# Patient Record
Sex: FEMALE | Race: BLACK OR AFRICAN AMERICAN | Employment: FULL TIME | URBAN - METROPOLITAN AREA
[De-identification: names, ages, dates, MRNs, and addresses within clinical notes are randomized per-mention and may not be internally consistent; named-entity substitution may affect disease eponyms.]

---

## 2017-10-20 ENCOUNTER — OFFICE VISIT (OUTPATIENT)
Dept: CARDIOLOGY CLINIC | Age: 62
End: 2017-10-20

## 2017-10-20 VITALS
DIASTOLIC BLOOD PRESSURE: 80 MMHG | OXYGEN SATURATION: 98 % | HEIGHT: 65 IN | BODY MASS INDEX: 26.33 KG/M2 | WEIGHT: 158 LBS | HEART RATE: 71 BPM | SYSTOLIC BLOOD PRESSURE: 150 MMHG

## 2017-10-20 DIAGNOSIS — Z87.19 HISTORY OF GASTROESOPHAGEAL REFLUX (GERD): ICD-10-CM

## 2017-10-20 DIAGNOSIS — R00.2 PALPITATIONS: Primary | ICD-10-CM

## 2017-10-20 NOTE — PROGRESS NOTES
1. Have you been to the ER, urgent care clinic since your last visit? Hospitalized since your last visit? No     2. Have you seen or consulted any other health care providers outside of the [address] [place] [address] since your last visit? Include any pap smears or colon screening.  No

## 2017-10-20 NOTE — PROGRESS NOTES
Valeri Woods presents today for evaluation of complaints of \"irregular heart beats. \"  She states that she has experienced palpitations before and this is similar but much milder than previous. She is under a lot of stress at work. The palpitations do not occur daily and she does notice a skipped beat or faster heart rate at times. She only drinks beverages with caffeine rarely and she has a small amount of chocolate almost daily. She is a 63year old female with history of palpitations, hyperthyroidism with thyroiditis, GERD, and atypical chest pain. Admits to having vertigo in the past.  She was last seen by Dr. Luis Manuel Rizzo in October 2016. Her last echo was done in 2007 and it showed an EFof 65% and no significant valvular pathology. She wore an event monitor in Feb. 2007, which showed sinus tachycardia. Denies chest pain, tightness, heaviness, and admits to palpitations. Denies shortness of breath at rest, dyspnea on exertion, orthopnea and PND. Denies abdominal bloating. Admits to occasional lightheadedness with sudden position changes, dizziness, and syncope. Denies lower extremity edema and claudication. Denies nausea, vomiting, diarrhea, melena, hematochezia. Denies hematuria, urgency, frequency. Denies fever, chills. She complains of some mild fatigue at times. PMH:  Past Medical History:   Diagnosis Date    Cardiac echocardiogram 01/30/2007    EF >65%. No significant valvular pathology.  History of gastroesophageal reflux (GERD)     Hyperthyroidism     s/thyroiditis.  Migraine equivalent     Palpitations     w/possible dual pathway on event monitor. PSH:  History reviewed. No pertinent surgical history. MEDS:  Current Outpatient Prescriptions   Medication Sig    metoprolol succinate (TOPROL-XL) 25 mg XL tablet Take 1 Tab by mouth daily.  fluticasone (FLONASE) 50 mcg/actuation nasal spray 2 Sprays by Right Nostril route daily.     cholecalciferol, vitamin D3, (VITAMIN D3) 2,000 unit tab Take  by mouth.  famotidine (PEPCID) 40 mg tablet Take 40 mg by mouth daily.  meclizine (ANTIVERT) 25 mg tablet Take 25 mg by mouth as needed. No current facility-administered medications for this visit. Allergies and Sensitivities:  Allergies   Allergen Reactions    Pseudoephedrine Palpitations    Sulfur Palpitations       Family History:  Family History   Problem Relation Age of Onset    Hypertension Mother     High Cholesterol Mother     Heart Surgery Father        Social History:  She  reports that she has never smoked. She has never used smokeless tobacco.  She  reports that she does not drink alcohol. Physical:  Visit Vitals    /80    Pulse 71    Ht 5' 5\" (1.651 m)    Wt 71.7 kg (158 lb)    SpO2 98%    BMI 26.29 kg/m2         Exam:  Neck:  Supple, no JVD, no carotid bruits  CV:  Normal S1 and  S2, no murmurs, rubs, or gallops noted  Lungs:  Clear to ausculation throughout, no wheezes or rales  Abd:  Soft, non-tender, non-distended with good bowel sounds. No hepatosplenomegaly  Extremities:  No edema      Data:  EKG:  Normal sinus rhythm, rate 71.  Long 1st degree AVB.  No acute change. LABS:  Lab Results   Component Value Date/Time    Sodium 140 12/09/2012 10:53 AM    Potassium 3.9 12/09/2012 10:53 AM    Chloride 104 12/09/2012 10:53 AM    CO2 29 12/09/2012 10:53 AM    Glucose 82 12/09/2012 10:53 AM    BUN 12 12/09/2012 10:53 AM    Creatinine 0.80 12/09/2012 10:53 AM     No results found for: CHOL, CHOLX, CHLST, CHOLV, HDL, LDL, LDLC, DLDLP, TGLX, TRIGL, TRIGP, CHHD, CHHDX  No results found for: GPT, ALT      Impression/Plan:  1. Palpitations, occurring occasionally  2. GERD, relieved by Pepcid  3. Hyperthyroidism  4. Elevated blood pressure    Mrs. Darline Homans was seen today for complaints of palpitations. They are similar to the ones she has had in the past, although they are not occurring as frequently.   She has been more aware of them lately as she has been under a lot of stress at work. She is trying to get back into an exercise routine as she states that this helped with stress relief in the past.     She denies chest pain and shortness of breath. Admits to some occasional fatigue. Her blood pressure was elevated at 164/94 upon arrival and when I rechecked it, it was down to 150/80. She states that she did not take her Toprol yet this morning as she did not eat breakfast yet. She states that she monitors her blood pressures at home and they typically range from the 408'A to 536'X systolic and 61'U to 53'O diastolic. I asked that she keep a blood pressure diary and to call our office if her blood pressures are consistently above 140/80. Will request that she wear a 48 hour Holter monitor (dx: palpitations) to evaluate frequency of ectopics (if present) as well as her overall heart rated. Follow-up to be scheduled to see Dr. Braulio Cueva (reminder in computer). Vivek Pineda MSN, FNP-BC    Please note:  Portions of this chart were created with Dragon medical speech to text program.  Unrecognized errors may be present.

## 2017-10-20 NOTE — MR AVS SNAPSHOT
Visit Information Date & Time Provider Department Dept. Phone Encounter #  
 10/20/2017  9:00 AM Christine Arias NP [facility] [address] [id number] Upcoming Health Maintenance Date Due Hepatitis C Screening 1955 DTaP/Tdap/Td series (1 - Tdap) 3/18/1978 PAP AKA CERVICAL CYTOLOGY 3/18/1978 BREAST CANCER SCRN MAMMOGRAM 3/18/2007 FOBT Q 1 YEAR AGE 50-75 3/18/2007 ZOSTER VACCINE AGE 60> 1/18/2017 INFLUENZA AGE 9 TO ADULT 8/1/2017 Allergies as of 10/20/2017  Review Complete On: 10/20/2017 By: Christine Arias NP Severity Noted Reaction Type Reactions Pseudoephedrine  12/09/2012    Palpitations Sulfur  12/09/2012    Palpitations Current Immunizations  Never Reviewed No immunizations on file. Not reviewed this visit You Were Diagnosed With   
  
 Codes Comments Palpitations    -  Primary ICD-10-CM: R00.2 ICD-9-CM: 785.1 History of gastroesophageal reflux (GERD)     ICD-10-CM: E22.88 ICD-9-CM: V12.79 Vitals BP Pulse Height(growth percentile) Weight(growth percentile) SpO2 BMI  
 150/80 71 5' 5\" (1.651 m) 158 lb (71.7 kg) 98% 26.29 kg/m2 OB Status Smoking Status Postmenopausal Never Smoker Vitals History BMI and BSA Data Body Mass Index Body Surface Area  
 26.29 kg/m 2 1.81 m 2 Preferred Pharmacy Pharmacy Name Phone [facility] [place], [address] [phone] Your Updated Medication List  
  
   
This list is accurate as of: 10/20/17  9:44 AM.  Always use your most recent med list.  
  
  
  
  
 fluticasone 50 mcg/actuation nasal spray Commonly known as:  Justino Metro 2 Sprays by Right Nostril route daily. meclizine 25 mg tablet Commonly known as:  ANTIVERT Take 25 mg by mouth as needed. metoprolol succinate 25 mg XL tablet Commonly known as:  TOPROL-XL  
 Take 1 Tab by mouth daily. PEPCID 40 mg tablet Generic drug:  famotidine Take 40 mg by mouth daily. VITAMIN D3 2,000 unit Tab Generic drug:  cholecalciferol (vitamin D3) Take  by mouth. We Performed the Following AMB POC EKG ROUTINE W/ 12 LEADS, INTER & REP [69950 CPT(R)] To-Do List   
 Around 10/27/2017 ECG:  ECG HOLTER MONITOR, UP TO 48 HRS Patient Instructions 48 hour Holter; dx: palpitations Follow-up with Dr. Dennis Garcia to be scheduled Introducing [facility]! Dear Laura Bryan: Thank you for requesting a ClearMyMail account. Our records indicate that you already have an active ClearMyMail account. You can access your account anytime at [email or web address] Did you know that you can access your hospital and ER discharge instructions at any time in ClearMyMail? You can also review all of your test results from your hospital stay or ER visit. Additional Information If you have questions, please visit the Frequently Asked Questions section of the ClearMyMail website at [email or web address] Remember, ClearMyMail is NOT to be used for urgent needs. For medical emergencies, dial 911. Now available from your iPhone and Android! Please provide this summary of care documentation to your next provider. Your primary care clinician is listed as Josue Scott. If you have any questions after today's visit, please call [phone].

## 2017-10-25 ENCOUNTER — HOSPITAL ENCOUNTER (OUTPATIENT)
Dept: NON INVASIVE DIAGNOSTICS | Age: 62
Discharge: HOME | End: 2017-10-25
Attending: NURSE PRACTITIONER
Payer: COMMERCIAL

## 2017-10-25 DIAGNOSIS — R00.2 PALPITATIONS: ICD-10-CM

## 2017-10-25 PROCEDURE — 93225 XTRNL ECG REC<48 HRS REC: CPT

## 2017-11-03 ENCOUNTER — TELEPHONE (OUTPATIENT)
Dept: CARDIOLOGY CLINIC | Age: 62
End: 2017-11-03

## 2017-11-03 NOTE — TELEPHONE ENCOUNTER
----- Message from Gladis Maxwell NP sent at 11/3/2017  9:06 AM EDT -----  Please call and let her know Holter was normal.  Sinus rhythm/sinus arrhythmia with 1 ectopic beat.   Max heart rate was 113 bpm.    Thanks,  Madina Menjivar  ----- Message -----     From: Dain, Card Result In     Sent: 11/2/2017   8:56 AM       To: Gladis Maxwell NP

## 2017-11-21 RX ORDER — METOPROLOL SUCCINATE 25 MG/1
TABLET, EXTENDED RELEASE ORAL
Qty: 30 TAB | Refills: 11 | Status: SHIPPED | OUTPATIENT
Start: 2017-11-21 | End: 2019-04-22 | Stop reason: ALTCHOICE

## 2018-03-20 ENCOUNTER — OFFICE VISIT (OUTPATIENT)
Dept: CARDIOLOGY CLINIC | Age: 63
End: 2018-03-20

## 2018-03-20 VITALS
DIASTOLIC BLOOD PRESSURE: 82 MMHG | WEIGHT: 161 LBS | OXYGEN SATURATION: 98 % | HEIGHT: 65 IN | SYSTOLIC BLOOD PRESSURE: 142 MMHG | BODY MASS INDEX: 26.82 KG/M2 | HEART RATE: 64 BPM

## 2018-03-20 DIAGNOSIS — R53.83 FATIGUE, UNSPECIFIED TYPE: ICD-10-CM

## 2018-03-20 DIAGNOSIS — E05.90 HYPERTHYROIDISM: ICD-10-CM

## 2018-03-20 DIAGNOSIS — R00.2 PALPITATIONS: Primary | ICD-10-CM

## 2018-03-20 NOTE — PROGRESS NOTES
Review of Systems   Constitutional: Negative for chills, fever, malaise/fatigue and weight loss. Respiratory: Negative for cough, hemoptysis, shortness of breath and wheezing. Cardiovascular: Positive for palpitations. Negative for chest pain, orthopnea and leg swelling. Gastrointestinal: Negative. Musculoskeletal: Positive for joint pain. Negative for falls and myalgias. Neurological: Positive for dizziness.

## 2018-03-20 NOTE — PROGRESS NOTES
HPI: I saw Leticia Dempsey Monday in my office today and cardiovascular evaluation with regard to her problems with palpitations for which I followed her for the past 7 years. Mrs. Dempsey Monday is a pleasant 79-year-old  female who has past history of hyperthyroidism with thyroiditis, gastroesophageal reflux disease, atypical anginal type chest discomforts, and palpitations the clear-cut etiology of which have never been elucidated. However, these palpitations have been well-controlled on low-dose Toprol 25 mg daily for some time. Historically, she did have an echocardiogram back in January 2007 which was completely normal.     She comes in today for the first time since October 2016 and tells me she has been doing fairly well but she still gets palpitations from time to time and has had 2 episodes which lasted for couple of minutes in the past 3 months that were concerning to her one of which was associated with shortness of breath and weakness. She does admit to some problems with fatigue, but she thinks this is related to depression since when she goes to the gym and works out the problem seems to largely resolve as does her depression. She is really not having any chest pain or any other cardiovascular complaints. Encounter Diagnoses   Name Primary?  Palpitations Yes    Fatigue, multifactorial     History of hyperthyroidism        Discussion: This lady has had some increased palpitations recently but she freely admits that she was eating a lot of chocolate a month or so ago when her longer episodes of palpitations occurred and she is going to try to limit her caffeine in the future and hopefully decrease her palpitations. It sounds as if taking Toprol-XL 25 mg a day controls her palpitations for the most part and I think adjusting her caffeine intake will help to some degree as well.   If her palpitations get worse the patient will give us a call and we will consider getting an Event monitor. She has been complaining of some fatigue issues, but as indicated above I suspect that this may be related to depression issues since both problems seem to resolve when she goes to the gym 3 or 4 times a week which I have encouraged her to do. Certainly if her fatigue worsens is associated with difficulty in exercising then we would have to at least consider the possibility of coronary artery obstructive disease and consider doing a stress or pharmacologic myocardial perfusion study. She has had some history of hyperthyroidism in the past, but she has had no problems in that regard in the recent past and certainly I will leave management of that to her family physician as well as her blood pressure which is minimally elevated today and I will simply plan to see her again in a year or sooner if any new cardiovascular symptoms surface in the interim. PCP: Bull Zavala MD      Past Medical History:   Diagnosis Date    Cardiac echocardiogram 01/30/2007    EF >65%. No significant valvular pathology.  History of gastroesophageal reflux (GERD)     Hyperthyroidism     s/thyroiditis.  Migraine equivalent     Palpitations     w/possible dual pathway on event monitor. No past surgical history on file. Current Outpatient Rx   Name  Route  Sig  Dispense  Refill    fluticasone (FLONASE) 50 mcg/actuation nasal spray    Right Nostril    2 Sprays by Right Nostril route daily. 4      meclizine (ANTIVERT) 25 mg tablet    Oral    Take 25 mg by mouth as needed. 1      metoprolol succinate (TOPROL-XL) 25 mg XL tablet        TAKE ONE TABLET BY MOUTH EVERY DAY    30 Tab    11      cholecalciferol, vitamin D3, (VITAMIN D3) 2,000 unit tab    Oral    Take  by mouth.  famotidine (PEPCID) 40 mg tablet    Oral    Take 40 mg by mouth daily.                    Allergies   Allergen Reactions    Pseudoephedrine Palpitations    Sulfur Palpitations       Social History :  Social History   Substance Use Topics    Smoking status: Never Smoker    Smokeless tobacco: Never Used    Alcohol use No        Family History: family history includes Heart Surgery in her father; High Cholesterol in her mother; Hypertension in her mother. Review of Systems:   Constitutional: Negative for chills, fever, malaise/fatigue and weight loss. Respiratory: Negative for cough, hemoptysis, shortness of breath and wheezing. Cardiovascular: Positive for palpitations. Negative for chest pain, orthopnea and leg swelling. Gastrointestinal: Negative. Musculoskeletal: Positive for joint pain. Negative for falls and myalgias. Neurological: Positive for dizziness. Physical Exam:    The patient is a cooperative, alert, well developed, well nourished 66 y.o. asthenic appearing   female who is in no acute distress at the time of the examination. Visit Vitals    /82    Pulse 64    Ht 5' 5\" (1.651 m)    Wt 73 kg (161 lb)    SpO2 98%    BMI 26.79 kg/m2       HEENT: Conjuctiva white, mucosa moist, no pallor or cyanosis. NECK: Supple without masses, tenderness or thyromegaly. There was no jugular venous distention. Carotid are full bilaterally without bruits. CHEST: Symmetrical with good excursion. LUNGS: Clear to auscultation in all fields. HEART: The apex is not displaced. There were no lifts, thrills or heaves. There is a normal S1 and S2 without appreciable murmurs, rubs, clicks, or gallops auscultated. ABDOMEN: Soft without masses, tenderness or organomegaly. EXTREMITIES: Full peripheral pulses without peripheral edema. INTEGUMENT: Warm and dry   NEUROLOGICAL: The patient is oriented x 3 with motor function grossly intact.     Review of Data: See PMH and Cardiology and Imaging sections for cardiac testing      Results for orders placed or performed in visit on 10/20/17   AMB POC EKG ROUTINE W/ 12 LEADS, INTER & REP     Status: None    Narrative    Read by Rosalina Durbin,  - Normal sinus rhythm, rate 71. Long 1st degree AVB. No acute change. Rosalina Durbin D.O., F.A.C.C. [facility]  [facility] [place]  [address] [place] [address]. [address] [address]    PLEASE NOTE:  This document has been produced using voice recognition software. Unrecognized errors in transcription may be present.

## 2018-03-20 NOTE — MR AVS SNAPSHOT
[address] [place] [id number] [phone] Patient: Dairo Alicia MRN: [id number] Visit Information Date & Time Provider Department Dept. Phone Encounter #  
 3/20/2018  3:20 PM Larry Saenz, [address] [place] [address] [facility] [address] [id number] Upcoming Health Maintenance Date Due Hepatitis C Screening 1955 DTaP/Tdap/Td series (1 - Tdap) 3/18/1978 PAP AKA CERVICAL CYTOLOGY 3/18/1978 BREAST CANCER SCRN MAMMOGRAM 3/18/2007 FOBT Q 1 YEAR AGE 50-75 3/18/2007 ZOSTER VACCINE AGE 60> 1/18/2017 Influenza Age 5 to Adult 8/1/2017 Allergies as of 3/20/2018  Review Complete On: 3/20/2018 By: Larry Saenz DO Severity Noted Reaction Type Reactions Pseudoephedrine  12/09/2012    Palpitations Sulfur  12/09/2012    Palpitations Current Immunizations  Never Reviewed No immunizations on file. Not reviewed this visit You Were Diagnosed With   
  
 Codes Comments Palpitations    -  Primary ICD-10-CM: R00.2 ICD-9-CM: 785.1 Hyperthyroidism     ICD-10-CM: E05.90 ICD-9-CM: 242.90 Vitals BP Pulse Height(growth percentile) Weight(growth percentile) SpO2 BMI  
 142/82 64 5' 5\" (1.651 m) 161 lb (73 kg) 98% 26.79 kg/m2 OB Status Smoking Status Postmenopausal Never Smoker Vitals History BMI and BSA Data Body Mass Index Body Surface Area  
 26.79 kg/m 2 1.83 m 2 Preferred Pharmacy Pharmacy Name Phone [facility] [place], [facility] [facility] [phone] Your Updated Medication List  
  
   
This list is accurate as of 3/20/18  4:24 PM.  Always use your most recent med list.  
  
  
  
  
 fluticasone 50 mcg/actuation nasal spray Commonly known as:  Alexis Remedies 2 Sprays by Right Nostril route daily. meclizine 25 mg tablet Commonly known as:  ANTIVERT  
 Take 25 mg by mouth as needed. metoprolol succinate 25 mg XL tablet Commonly known as:  TOPROL-XL  
TAKE 1 TABLET BY MOUTH EVERY DAY  
  
 PEPCID 40 mg tablet Generic drug:  famotidine Take 40 mg by mouth daily. VITAMIN D3 2,000 unit Tab Generic drug:  cholecalciferol (vitamin D3) Take  by mouth. We Performed the Following AMB POC EKG ROUTINE W/ 12 LEADS, INTER & REP [69307 CPT(R)] Introducing [facility]! Rubialanis Otilia introduces YumDots patient portal. Now you can access parts of your medical record, email your doctor's office, and request medication refills online. 1. In your internet browser, go to [email or web address] 2. Click on the First Time User? Click Here link in the Sign In box. You will see the New Member Sign Up page. 3. Enter your YumDots Access Code exactly as it appears below. You will not need to use this code after youve completed the sign-up process. If you do not sign up before the expiration date, you must request a new code. · YumDots Access Code: [email or web address] Expires: 6/18/2018  3:19 PM 
 
4. Enter the last four digits of your Social Security Number (xxxx) and Date of Birth (mm/dd/yyyy) as indicated and click Submit. You will be taken to the next sign-up page. 5. Create a YumDots ID. This will be your YumDots login ID and cannot be changed, so think of one that is secure and easy to remember. 6. Create a YumDots password. You can change your password at any time. 7. Enter your Password Reset Question and Answer. This can be used at a later time if you forget your password. 8. Enter your e-mail address. You will receive e-mail notification when new information is available in [address] 9. Click Sign Up. You can now view and download portions of your medical record. 10. Click the Download Summary menu link to download a portable copy of your medical information. If you have questions, please visit the Frequently Asked Questions section of the [employer]ook[employer] website. Remember, Huddle is NOT to be used for urgent needs. For medical emergencies, dial 911. Now available from your iPhone and Android! Please provide this summary of care documentation to your next provider. Your primary care clinician is listed as Dada Person. If you have any questions after today's visit, please call [phone].

## 2018-03-20 NOTE — PROGRESS NOTES
1. Have you been to the ER, urgent care clinic since your last visit? Hospitalized since your last visit?no    2. Have you seen or consulted any other health care providers outside of the [address] [place] [address] since your last visit? Include any pap smears or colon screening.  no

## 2018-06-19 ENCOUNTER — TELEPHONE (OUTPATIENT)
Dept: CARDIOLOGY CLINIC | Age: 63
End: 2018-06-19

## 2018-06-19 NOTE — TELEPHONE ENCOUNTER
Patient called the office this morning and states that she has started having episodes of more fatigue and some dizziness. She states that she is on metoprolol succ 25mg daily. She states that her and Dr Bettye Gregory talked about possibly stopping this medication at her last office visit because her palpitations were much better. Patient states that she is going to take half a tablet (12.5mg) daily over the next couple of weeks, keep an eye on her blood pressure and heart rate, and call me and let me know how she is doing. Will forward to Dr Bettye Gregory to make him aware.

## 2018-06-20 NOTE — TELEPHONE ENCOUNTER
Me   to Dwaine Martino, DO           6/19/18 9:47 AM   FYI :) Just wanted to make you aware. Dwaine Markw, DO   to Me           6/19/18 6:18 PM   I can't believe it will help, but I am OK with that.  ES

## 2019-04-22 ENCOUNTER — OFFICE VISIT (OUTPATIENT)
Dept: CARDIOLOGY CLINIC | Age: 64
End: 2019-04-22

## 2019-04-22 VITALS
WEIGHT: 158 LBS | HEIGHT: 65 IN | BODY MASS INDEX: 26.33 KG/M2 | DIASTOLIC BLOOD PRESSURE: 98 MMHG | OXYGEN SATURATION: 99 % | HEART RATE: 93 BPM | SYSTOLIC BLOOD PRESSURE: 150 MMHG

## 2019-04-22 DIAGNOSIS — E05.90 HYPERTHYROIDISM: ICD-10-CM

## 2019-04-22 DIAGNOSIS — R42 DIZZINESS: ICD-10-CM

## 2019-04-22 DIAGNOSIS — R00.2 PALPITATIONS: Primary | ICD-10-CM

## 2019-04-22 RX ORDER — BISOPROLOL FUMARATE 5 MG/1
5 TABLET ORAL DAILY
Qty: 30 TAB | Refills: 6 | Status: SHIPPED | OUTPATIENT
Start: 2019-04-22 | End: 2019-12-13 | Stop reason: SDUPTHER

## 2019-04-22 NOTE — ADDENDUM NOTE
Addended by: Lina Chang on: 4/22/2019 04:29 PM 
 
 Modules accepted: Orders tinnitus/nasal congestion sinus symptoms/nasal congestion/tinnitus/left jaw pain "yesterday"

## 2019-04-22 NOTE — PROGRESS NOTES
HPI:  I saw Victorina CardosoSanchez Larkin in my office today and cardiovascular evaluation with regard to her problems with palpitations for which I followed her for the past 7 years. Mrs. Ferny Larkin is a pleasant 80-year-old  female who has past history of hyperthyroidism with thyroiditis, gastroesophageal reflux disease, atypical anginal type chest discomforts, and palpitations the clear-cut etiology of which have never been elucidated. However, these palpitations have been well-controlled on low-dose Toprol 25 mg daily for some time. Historically, she did have an echocardiogram back in January 2007 which was completely normal. 
 
She comes in today and relates that she discontinue the Toprol a couple of months ago due to lightheadedness and some night terror problems. She try to cut the Toprol down to 25 mg every other day which seemed to improve her symptoms but she simply decided to discontinue the medication. Her palpitations have been somewhat worse since that time and her blood pressure has been higher. Encounter Diagnoses Name Primary?  Palpitations Yes  History of hyperthyroidism  Dizziness Discussion: This patient appears to be doing reasonably well but had some symptoms of lightheadedness and night terrors that she felt was related to her Toprol. She feels better off the Toprol but she is having more palpitations and I am going to simply make a change and try her on a low-dose bisoprolol 5 mg daily to see if this helps with her palpitations without giving her dizziness issues or causing any night terrors. Her blood pressure today was somewhat elevated at 144/92 when checked again 150/98. Hopefully on bisoprolol her blood pressure will be somewhat better.  
 
She does have history of hyperthyroidism at one point in the past secondary to thyroiditis but I do not see any recent thyroid testing and I think getting a TSH on her may help us sort out whether any recurrent hyperthyroid issues might be making her palpitations worse. Since she is otherwise doing reasonably well I will simply plan to see her again in a year. PCP: Skyler Mckeon MD 
 
 
Past Medical History:  
Diagnosis Date  Cardiac echocardiogram 01/30/2007 EF >65%. No significant valvular pathology.  History of gastroesophageal reflux (GERD)  Hyperthyroidism   
 s/thyroiditis.  Migraine equivalent  Palpitations   
 w/possible dual pathway on event monitor. History reviewed. No pertinent surgical history. Current Outpatient Medications Medication Sig  
 bisoprolol (ZEBETA) 5 mg tablet Take 1 Tab by mouth daily.  fluticasone (FLONASE) 50 mcg/actuation nasal spray 2 Sprays by Right Nostril route daily.  meclizine (ANTIVERT) 25 mg tablet Take 25 mg by mouth as needed.  cholecalciferol, vitamin D3, (VITAMIN D3) 2,000 unit tab Take  by mouth.  famotidine (PEPCID) 40 mg tablet Take 40 mg by mouth daily. No current facility-administered medications for this visit. Allergies Allergen Reactions  Pseudoephedrine Palpitations  Sulfur Palpitations Social History : 
Social History Tobacco Use  Smoking status: Never Smoker  Smokeless tobacco: Never Used Substance Use Topics  Alcohol use: No  
  
 
Family History: family history includes Heart Surgery in her father; High Cholesterol in her mother; Hypertension in her mother. Review of Systems:  
Constitutional: Positive for malaise/fatigue. Negative for chills, fever and weight loss. Respiratory: Negative. Cardiovascular: Positive for palpitations. Negative for chest pain, orthopnea and leg swelling. Gastrointestinal: Negative. Musculoskeletal: Positive for joint pain. Negative for falls and myalgias. Neurological: Positive for dizziness.   
 
Physical Exam:   
The patient is a cooperative, alert, well developed, well nourished 60 y.o. asthenic appearing   female who is in no acute distress at the time of the examination. Visit Vitals BP (!) 150/98 Pulse 93 Ht 5' 5\" (1.651 m) Wt 71.7 kg (158 lb) SpO2 99% BMI 26.29 kg/m² HEENT: Conjuctiva white, mucosa moist, no pallor or cyanosis. NECK: Supple without masses, tenderness or thyromegaly. There was no jugular venous distention. Carotid are full bilaterally without bruits. CHEST: Symmetrical with good excursion. LUNGS: Clear to auscultation in all fields. HEART: The apex is not displaced. There were no lifts, thrills or heaves. There is a normal S1 and S2 without appreciable murmurs, rubs, clicks, or gallops auscultated. ABDOMEN: Soft without masses, tenderness or organomegaly. EXTREMITIES: Full peripheral pulses without peripheral edema. INTEGUMENT: Warm and dry NEUROLOGICAL: The patient is oriented x 3 with motor function grossly intact. Review of Data: See PMH and Cardiology and Imaging sections for cardiac testing Results for orders placed or performed in visit on 04/22/19 AMB POC EKG ROUTINE W/ 12 LEADS, INTER & REP     Status: None Narrative Normal sinus rhythm rate 93. First-degree AV block. This EKG is otherwise within normal limits and similar to the EKG of March 20, 2018 except for a faster rate being 64 at that time. Sylwia Gore D.O., F.A.C.C. [facility] [address] [place] [address] and [facility] [place] [id number] [address] [address] [facility], [place] [phone] PLEASE NOTE:  This document has been produced using voice recognition software. Unrecognized errors in transcription may be present.

## 2019-04-24 ENCOUNTER — HOSPITAL ENCOUNTER (OUTPATIENT)
Dept: LAB | Age: 64
Discharge: HOME | End: 2019-04-24

## 2019-04-24 LAB — SENTARA SPECIMEN COL,SENBCF: NORMAL

## 2019-04-24 PROCEDURE — 99001 SPECIMEN HANDLING PT-LAB: CPT

## 2019-04-25 LAB — TSH SERPL DL<=0.005 MIU/L-ACNC: 0.75 MCU/ML (ref 0.27–4.2)

## 2019-04-29 NOTE — PROGRESS NOTES
Her TSH is normal so it does not appear as if her thyroid is having any effect on her palpitations. Please let the patient know.  ES

## 2019-04-30 ENCOUNTER — TELEPHONE (OUTPATIENT)
Dept: CARDIOLOGY CLINIC | Age: 64
End: 2019-04-30

## 2019-04-30 NOTE — TELEPHONE ENCOUNTER
----- Message from Heaven Harp DO sent at 4/29/2019  5:34 PM EDT -----  Her TSH is normal so it does not appear as if her thyroid is having any effect on her palpitations. Please let the patient know.  ES

## 2019-12-13 RX ORDER — BISOPROLOL FUMARATE 5 MG/1
5 TABLET ORAL DAILY
Qty: 90 TAB | Refills: 3 | Status: SHIPPED | OUTPATIENT
Start: 2019-12-13 | End: 2020-02-14

## 2020-01-06 ENCOUNTER — TELEPHONE (OUTPATIENT)
Dept: CARDIOLOGY CLINIC | Age: 65
End: 2020-01-06

## 2020-01-06 DIAGNOSIS — R00.2 PALPITATIONS: Primary | ICD-10-CM

## 2020-01-06 NOTE — TELEPHONE ENCOUNTER
Patient called to state she has noticed recently more hard pounding heart beats not fast and different then her palps she had in the past. Blood pressures she has been monitoring which is normal 130/80 but unable to state her heart rate. She states she has them all day but notice more at rest while resting.      Verbal order and read back per Veronique Base, DO  48 hour holter if having freq  Event monitor if not every day     Patient aware and  will call to schedule

## 2020-01-10 NOTE — PROGRESS NOTES
Graciela Garcia presents today for evaluation of complaints of \"fast heart beats while lying down. \"  When she called to report her symptoms on 1/6/20, Dr. Anthony Sherman requested that she wear a 48 hour Holter monitor and it is scheduled for Jan. 16, 2020. She states that she has been able to associate the short burst of faster heart beats to eating food with MSG and the remainder of the time, she describes feeling like her \"heart is pounding\" but not necessarily going fast or beating irregularly. She tends to notice it at night when she is lying down. She admits to increased stress and describes herself as \"being anxious or worrying about things often. \"    She is a 60year old female with history of palpitations, hyperthyroidism with thyroiditis, GERD, and atypical chest pain. Admits to having vertigo in the past.  She was last seen by Dr. Anthony Sherman in April 2019. Her last echo was done in 2007 and it showed an EFof 65% and no significant valvular pathology. She wore a 48 hour Holter monitor in Oct. 2017, which showed sinus rhythm and sinus arrhythmia. Denies chest pain, tightness, heaviness, and admits to palpitations or \"pounding\" sensation in her chest.  Denies shortness of breath at rest, dyspnea on exertion, orthopnea and PND. Denies abdominal bloating. Admits to occasional lightheadedness with sudden position changes, denies dizziness, and syncope. Denies lower extremity edema and claudication. Denies nausea, vomiting, diarrhea, melena, hematochezia. Denies hematuria, urgency, frequency. Denies fever, chills. PMH:  Past Medical History:   Diagnosis Date    Cardiac echocardiogram 01/30/2007    EF >65%. No significant valvular pathology.  History of gastroesophageal reflux (GERD)     Hyperthyroidism     s/thyroiditis.  Migraine equivalent     Palpitations     w/possible dual pathway on event monitor. PSH:  No past surgical history on file.     MEDS:  Current Outpatient Medications Medication Sig    bisoprolol (ZEBETA) 5 mg tablet Take 1 Tab by mouth daily.  fluticasone (FLONASE) 50 mcg/actuation nasal spray 2 Sprays by Right Nostril route daily.  famotidine (PEPCID) 40 mg tablet Take 40 mg by mouth daily.  meclizine (ANTIVERT) 25 mg tablet Take 25 mg by mouth as needed.  cholecalciferol, vitamin D3, (VITAMIN D3) 2,000 unit tab Take  by mouth. No current facility-administered medications for this visit. Allergies and Sensitivities:  Allergies   Allergen Reactions    Pseudoephedrine Palpitations    Sulfur Palpitations       Family History:  Family History   Problem Relation Age of Onset    Hypertension Mother     High Cholesterol Mother     Heart Surgery Father        Social History:  She  reports that she has never smoked. She has never used smokeless tobacco.  She  reports no history of alcohol use. Physical:  Visit Vitals  /80 (BP 1 Location: Left arm, BP Patient Position: Sitting)   Pulse 67   Resp 16   Ht 5' 5\" (1.651 m)   Wt 71.2 kg (157 lb)   SpO2 99%   BMI 26.13 kg/m²         Exam:  Neck:  Supple, no JVD, no carotid bruits  CV:  Normal S1 and  S2, no murmurs, rubs, or gallops noted  Lungs:  Clear to ausculation throughout, no wheezes or rales  Abd:  Soft, non-tender, non-distended with good bowel sounds. No hepatosplenomegaly  Extremities:  No edema      Data:  EKG:  .Read by Dm Bahena DO.  Sinus rhythm, first degree AV block.  Low voltage in precordial leads, incomplete right bundle branch block.       LABS:  Lab Results   Component Value Date/Time    Sodium 140 12/09/2012 10:53 AM    Potassium 3.9 12/09/2012 10:53 AM    Chloride 104 12/09/2012 10:53 AM    CO2 29 12/09/2012 10:53 AM    Glucose 82 12/09/2012 10:53 AM    BUN 12 12/09/2012 10:53 AM    Creatinine 0.80 12/09/2012 10:53 AM     No results found for: CHOL, CHOLX, CHLST, CHOLV, HDL, HDLP, LDL, LDLC, DLDLP, TGLX, TRIGL, TRIGP, CHHD, CHHDX  No results found for: GPT, ALT      Impression/Plan:  1. Palpitations, \"pounding\" sensation  2. GERD  3. Hyperthyroidism, reports labs not checked for about 2 years      Mrs. Rachell De La Rosa was seen today for complaints of feeling like her heart is \"pounding\", more noticeable at night when she is lying down. She has had rare, very brief episodes of faster heart rate that she has associated with eating food prepared with MSG. She admits to increased stress at work and she admits to being a \"worrier. \"   She denies drinking energy drinks, drinks very little caffeine, and admits to eating a small piece of chocolate for a few days over the holidays. When asked about her thyroid labs, she states that her free T4 was \"normal\" the last time it was checked about 2 years ago at her PCP's office. She has not seen the endocrinologist in some time. We will go ahead and check her TSH and free T4. She is scheduled to wear a Holter monitor on 1/16/20. I will also request that she have an echo done as her last one was done in 2007. Follow-up with Dr. Josef Montoya as scheduled and as needed. Rakesh Faulkner MSN, FNP-BC    Please note:  Portions of this chart were created with Dragon medical speech to text program.  Unrecognized errors may be present.

## 2020-01-14 ENCOUNTER — OFFICE VISIT (OUTPATIENT)
Dept: CARDIOLOGY CLINIC | Age: 65
End: 2020-01-14

## 2020-01-14 VITALS
WEIGHT: 157 LBS | DIASTOLIC BLOOD PRESSURE: 80 MMHG | BODY MASS INDEX: 26.16 KG/M2 | RESPIRATION RATE: 16 BRPM | HEIGHT: 65 IN | OXYGEN SATURATION: 99 % | SYSTOLIC BLOOD PRESSURE: 118 MMHG | HEART RATE: 67 BPM

## 2020-01-14 DIAGNOSIS — R00.2 PALPITATIONS: Primary | ICD-10-CM

## 2020-01-14 DIAGNOSIS — E05.90 HYPERTHYROIDISM: ICD-10-CM

## 2020-01-14 NOTE — PATIENT INSTRUCTIONS
Echocardiogram:  Dx:  Palpitations  If you do not hear from [facility] in 2-3 business days, please call [phone] to schedule your test  Holter monitor as ordered by Dr. Tova Robles (scheduled for 1/16/2020)  TSH and free T4 (labs)  Follow-up with Dr. Tova Robles as scheduled and as needed

## 2020-01-16 ENCOUNTER — HOSPITAL ENCOUNTER (OUTPATIENT)
Dept: NON INVASIVE DIAGNOSTICS | Age: 65
Discharge: HOME | End: 2020-01-16
Attending: INTERNAL MEDICINE
Payer: COMMERCIAL

## 2020-01-16 DIAGNOSIS — R00.2 PALPITATIONS: ICD-10-CM

## 2020-01-16 PROCEDURE — 93226 XTRNL ECG REC<48 HR SCAN A/R: CPT

## 2020-01-21 NOTE — PROGRESS NOTES
Per last note \"Patient called to state she has noticed recently more hard pounding heart beats not fast and different then her palps she had in the past. Blood pressures she has been monitoring which is normal 130/80 but unable to state her heart rate. She states she has them all day but notice more at rest while resting.  \" overdose

## 2020-01-25 NOTE — PROGRESS NOTES
This looked fine. No fast heart rhythms. I think I already sent a note on this. Nothing to do. Please let the patient know.  ES

## 2020-01-25 NOTE — PROGRESS NOTES
This study appeared to be normal with normal rhythm and only one extra beat from the bottom of the heart and one from the top of the heart which completely normal and no fast heart rates so nothing to do. Please let the patient know.  ES

## 2020-01-29 ENCOUNTER — PATIENT MESSAGE (OUTPATIENT)
Dept: CARDIOLOGY CLINIC | Age: 65
End: 2020-01-29

## 2020-01-29 DIAGNOSIS — R00.2 PALPITATIONS: Primary | ICD-10-CM

## 2020-01-29 NOTE — TELEPHONE ENCOUNTER
Patient called to talk about her Holter results and her bp. Ms Kimi Becker is concerned about her bp being slightly elevated. There was mention of an Echo at her visit on 01/14/2020. She is very interested in having that done.

## 2020-01-31 ENCOUNTER — TELEPHONE (OUTPATIENT)
Dept: CARDIOLOGY CLINIC | Age: 65
End: 2020-01-31

## 2020-01-31 NOTE — TELEPHONE ENCOUNTER
Patient called in today to report that she was in the E.R. last night for palpitations and at the time her BP was 193/98. She reports no changes in medications were made but she would like to address this issue. Patient is currently only on Zebeta 5 mg daily. Patient was asked to get a current BP for the day which she reports was 162/92 and 5 minutes later was 154/86.  Please advise

## 2020-01-31 NOTE — TELEPHONE ENCOUNTER
Verbal order and read back per Amy Barriga NP  Increase Zebeta to 10mg once daily  Keep scheduled follow up 2/14/2020    This has been fully explained to the patient, who indicates understanding.

## 2020-02-05 DIAGNOSIS — R00.2 PALPITATIONS: Primary | ICD-10-CM

## 2020-02-10 ENCOUNTER — HOSPITAL ENCOUNTER (OUTPATIENT)
Dept: NON INVASIVE DIAGNOSTICS | Age: 65
Discharge: HOME | End: 2020-02-10
Attending: NURSE PRACTITIONER
Payer: COMMERCIAL

## 2020-02-10 VITALS
WEIGHT: 157 LBS | BODY MASS INDEX: 24.64 KG/M2 | DIASTOLIC BLOOD PRESSURE: 80 MMHG | SYSTOLIC BLOOD PRESSURE: 118 MMHG | HEIGHT: 67 IN

## 2020-02-10 DIAGNOSIS — R00.2 PALPITATIONS: ICD-10-CM

## 2020-02-10 LAB
AV VELOCITY RATIO: 0.93
ECHO AO ARCH DIAM: 2.23 CM
ECHO AO ASC DIAM: 2.96 CM
ECHO AO ROOT DIAM: 2.79 CM
ECHO AV AREA PEAK VELOCITY: 2.3 CM2
ECHO AV AREA/BSA PEAK VELOCITY: 1.2 CM2/M2
ECHO AV PEAK GRADIENT: 8.5 MMHG
ECHO AV PEAK VELOCITY: 145.9 CM/S
ECHO IVC SNIFF: 1.88 CM
ECHO LA MAJOR AXIS: 3.18 CM
ECHO LA TO AORTIC ROOT RATIO: 1.14
ECHO LV EDV A2C: 117.1 ML
ECHO LV EDV A4C: 116.4 ML
ECHO LV EDV BP: 120.7 ML (ref 56–104)
ECHO LV EDV INDEX A4C: 63.8 ML/M2
ECHO LV EDV INDEX BP: 66.2 ML/M2
ECHO LV EDV NDEX A2C: 64.2 ML/M2
ECHO LV EDV TEICHHOLZ: 0.4 ML
ECHO LV EJECTION FRACTION A2C: 56 %
ECHO LV EJECTION FRACTION A4C: 54 %
ECHO LV EJECTION FRACTION BIPLANE: 56.2 % (ref 55–100)
ECHO LV ESV A2C: 51.4 ML
ECHO LV ESV A4C: 53.6 ML
ECHO LV ESV BP: 52.9 ML (ref 19–49)
ECHO LV ESV INDEX A2C: 28.2 ML/M2
ECHO LV ESV INDEX A4C: 29.4 ML/M2
ECHO LV ESV INDEX BP: 29 ML/M2
ECHO LV ESV TEICHHOLZ: 0.18 ML
ECHO LV INTERNAL DIMENSION DIASTOLIC: 3.95 CM (ref 3.9–5.3)
ECHO LV INTERNAL DIMENSION SYSTOLIC: 2.82 CM
ECHO LV IVSD: 0.97 CM (ref 0.6–0.9)
ECHO LV MASS 2D: 139.8 G (ref 67–162)
ECHO LV MASS INDEX 2D: 76.6 G/M2 (ref 43–95)
ECHO LV POSTERIOR WALL DIASTOLIC: 1.02 CM (ref 0.6–0.9)
ECHO LVOT DIAM: 1.77 CM
ECHO LVOT PEAK GRADIENT: 7.4 MMHG
ECHO LVOT PEAK VELOCITY: 135.92 CM/S
ECHO LVOT SV: 74.4 ML
ECHO LVOT VTI: 30.34 CM
ECHO MV A VELOCITY: 79.12 CM/S
ECHO MV E DECELERATION TIME (DT): 178.2 MS
ECHO MV E VELOCITY: 123.53 CM/S
ECHO MV E/A RATIO: 1.56
ECHO MV REGURGITANT RADIUS PISA: 0.44 CM
ECHO PULMONARY ARTERY SYSTOLIC PRESSURE (PASP): 29.5 MMHG
ECHO RA MINOR AXIS: 3.78 CM
ECHO TV REGURGITANT MAX VELOCITY: 257 CM/S
ECHO TV REGURGITANT PEAK GRADIENT: 26.5 MMHG
LVFS 2D: 28.63 %
LVOT MG: 3.7 MMHG
LVOT MV: 0.9 CM/S
LVSV (MOD BI): 36.97 ML
LVSV (MOD SINGLE 4C): 34.22 ML
LVSV (MOD SINGLE): 35.82 ML
LVSV (TEICH): 20.67 ML
MV DEC SLOPE: 6.93

## 2020-02-10 PROCEDURE — 93306 TTE W/DOPPLER COMPLETE: CPT

## 2020-02-10 NOTE — PROGRESS NOTES
Sun Blackmon presents today for a post-ER follow-up for palpitations. She presented to the ER at [facility] on 1/3020 with the same complaints she was seen for in the office on 1/14/20. During that visit, I ordered a TSH, free T4, as well as an echocardiogram.  Labs were done during her ER visit and her Thyroid studies were normal.  No EKG changes. The only abnormality was that her blood pressure was quite elevated. After being discharged home from the ER, she called the office to report her blood pressure and her Jannifer Charity was increased to 10mg daily and asked to return for follow-up as scheduled. She had the echocardiogram done on 2/10/20 and it showed an EF of 55-60%, no regional WMA, grade 1 diastolic dysfunction, mild mitral valve regurgitation, and PASP of 29.5 mmHg. She wore the Holter monitor on 1/16/20 and it showed sinus rhythm to sinus arrhythmia with one ventricular ectopic. She reports that she only took the increased dose of Jannifer Charity for 2 days as she noticed more fatigue on the higher dose. She has been monitoring her blood pressures at home and she has noticed her blood pressure is higher in the late afternoon/early evening. The highest systolic has been 120'K to 150's. She is a 60year old female with history of palpitations, hyperthyroidism with thyroiditis, GERD, and atypical chest pain. Admits to having vertigo in the past.  She was last seen by Dr. Josef Deleon in April 2019. Her last echo was done in 2007 and it showed an EFof 65% and no significant valvular pathology. She wore a 48 hour Holter monitor in Oct. 2017, which showed sinus rhythm and sinus arrhythmia. Denies chest pain, tightness, heaviness, and admits to palpitations or \"pounding\" sensation in her chest.  Denies shortness of breath at rest, dyspnea on exertion, orthopnea and PND. Denies abdominal bloating.   Admits to occasional lightheadedness with sudden position changes, denies dizziness, and syncope. Denies lower extremity edema and claudication. Denies nausea, vomiting, diarrhea, melena, hematochezia. Denies hematuria, urgency, frequency. Denies fever, chills. PMH:  Past Medical History:   Diagnosis Date    Cardiac echocardiogram 01/30/2007    EF >65%. No significant valvular pathology.  History of gastroesophageal reflux (GERD)     Hyperthyroidism     s/thyroiditis.  Migraine equivalent     Palpitations     w/possible dual pathway on event monitor. PSH:  No past surgical history on file. MEDS:  Current Outpatient Medications   Medication Sig    bisoprolol (ZEBETA) 5 mg tablet Take 1 Tab by mouth daily.  fluticasone (FLONASE) 50 mcg/actuation nasal spray 2 Sprays by Right Nostril route daily.  famotidine (PEPCID) 40 mg tablet Take 40 mg by mouth daily.  meclizine (ANTIVERT) 25 mg tablet Take 25 mg by mouth as needed.  cholecalciferol, vitamin D3, (VITAMIN D3) 2,000 unit tab Take  by mouth. No current facility-administered medications for this visit. Allergies and Sensitivities:  Allergies   Allergen Reactions    Pseudoephedrine Palpitations    Sulfur Palpitations       Family History:  Family History   Problem Relation Age of Onset    Hypertension Mother     High Cholesterol Mother     Heart Surgery Father        Social History:  She  reports that she has never smoked. She has never used smokeless tobacco.  She  reports no history of alcohol use. Physical:  Visit Vitals  /78 (BP 1 Location: Right arm, BP Patient Position: Sitting)   Pulse (!) 52   Resp 16   Ht 5' 7\" (1.702 m)   Wt 69.9 kg (154 lb)   SpO2 99%   BMI 24.12 kg/m²     Her weight is down 3 pounds since her last visit    Exam:  Neck:  Supple, no JVD, no carotid bruits  CV:  Normal S1 and  S2, no murmurs, rubs, or gallops noted  Lungs:  Clear to ausculation throughout, no wheezes or rales  Abd:  Soft, non-tender, non-distended with good bowel sounds.   No hepatosplenomegaly  Extremities:  No edema      Data:  EKG:  .Read by Stephanie Stevenson MD.  Sinus bradycardia.  Otherwise normal EKG. LABS:  Lab Results   Component Value Date/Time    Sodium 138 01/30/2020 09:25 PM    Potassium 3.8 01/30/2020 09:25 PM    Chloride 104 01/30/2020 09:25 PM    CO2 26 01/30/2020 09:25 PM    Glucose 93 01/30/2020 09:25 PM    BUN 17 01/30/2020 09:25 PM    Creatinine 0.8 01/30/2020 09:25 PM     No results found for: CHOL, CHOLX, CHLST, CHOLV, HDL, HDLP, LDL, LDLC, DLDLP, TGLX, TRIGL, TRIGP, CHHD, CHHDX  No results found for: GPT, ALT      Impression/Plan:  1. Palpitations, \"pounding\" sensation  2. GERD  3. Hyperthyroidism, recent labs show normal thyroid function  4. Hypertension, blood pressure elevated      Mrs. Janey Adames was seen today for a post-ER follow-up. She presented to the ER at [facility] on 1/3020 with the same complaints she was seen for in the office on 1/14/20. During that visit, I ordered a TSH, free T4, as well as an echocardiogram.  Labs were done during her ER visit and her Thyroid studies were normal.  No EKG changes. The only abnormality was that her blood pressure was quite elevated. After being discharged home from the ER, she called the office to report her blood pressure and her Chayito Medford was increased to 10mg daily and asked to return for follow-up as scheduled. She reports that she only took the increased dose of Cahyito Medford for 2 days and then resumed taking her previous 5mg once a day as she noticed more fatigue on the higher dose. She has been monitoring her blood pressures at home and she has noticed her blood pressure is higher in the late afternoon/early evening. The highest systolic has been 883'S to 150's. She reports that she has resumed an exercise routine and has modified her diet somewhat (was eating fairly healthy before but now trying to be more consistent with this).     She was asked to continue Zebeta 5mg daily in the morning and lisinopril 2.5mg daily in the evening will be added. Rationale for staggering the medications was explained to her. She will return for follow-up with me in 2 weeks. Lab results, echo, and Holter monitor results were discussed with her today. She still reports occasional \"pounding\" sensation in her chest which is more noticeable when she is lying in the bed at night. She has not noticed her heart racing or any skipped beats. Follow-up with Dr. Luis Manuel Rizzo as scheduled and as needed. Anette Bennett MSN, FNP-BC    Please note:  Portions of this chart were created with Dragon medical speech to text program.  Unrecognized errors may be present.

## 2020-02-12 NOTE — PROGRESS NOTES
Results of echo discussed with Ms. Janey Adames. EF within normal range at 55-60% and no wall motion abnormalities. Instructed to follow-up with Dr. Jarrod Dominguez on 2/14/20 as scheduled.

## 2020-02-14 ENCOUNTER — OFFICE VISIT (OUTPATIENT)
Dept: CARDIOLOGY CLINIC | Age: 65
End: 2020-02-14

## 2020-02-14 VITALS
HEIGHT: 67 IN | SYSTOLIC BLOOD PRESSURE: 130 MMHG | RESPIRATION RATE: 16 BRPM | DIASTOLIC BLOOD PRESSURE: 70 MMHG | OXYGEN SATURATION: 99 % | BODY MASS INDEX: 24.17 KG/M2 | WEIGHT: 154 LBS | HEART RATE: 52 BPM

## 2020-02-14 DIAGNOSIS — E05.90 HYPERTHYROIDISM: ICD-10-CM

## 2020-02-14 DIAGNOSIS — R00.2 PALPITATIONS: Primary | ICD-10-CM

## 2020-02-14 RX ORDER — BISOPROLOL FUMARATE 5 MG/1
10 TABLET ORAL DAILY
Qty: 1 TAB | Refills: 0
Start: 2020-02-14 | End: 2020-02-14

## 2020-02-14 RX ORDER — LISINOPRIL 2.5 MG/1
2.5 TABLET ORAL DAILY
Qty: 30 TAB | Refills: 6 | Status: SHIPPED | OUTPATIENT
Start: 2020-02-14 | End: 2021-06-23

## 2020-02-14 RX ORDER — BISOPROLOL FUMARATE 5 MG/1
5 TABLET ORAL DAILY
Qty: 1 TAB | Refills: 0
Start: 2020-02-14 | End: 2020-10-12 | Stop reason: SDUPTHER

## 2020-02-14 NOTE — PATIENT INSTRUCTIONS
Begin lisinopril 2.5mg once a day  Continue Zebeta 5mg once a day  Follow-up in 2 weeks with Baylee Varner with Dr. Sylvia Moran as scheduled and as needed

## 2020-02-14 NOTE — PROGRESS NOTES
Identified pt with two pt identifiers(name and ). Reviewed record in preparation for visit and have obtained necessary documentation. Chief Complaint   Patient presents with   [facility] Follow Up      at [facility] for BP issues        Health Maintenance Due   Topic    Hepatitis C Screening     DTaP/Tdap/Td series (1 - Tdap)    PAP AKA CERVICAL CYTOLOGY     Lipid Screen     Shingrix Vaccine Age 49> (1 of 2)    Breast Cancer Screen Mammogram     FOBT Q1Y Age 54-65     Influenza Age 5 to Adult        Coordination of Care Questionnaire:  :   1) Have you been to an emergency room, urgent care, or hospitalized since your last visit? If yes, where when, and reason for visit? yes       2. Have seen or consulted any other health care provider since your last visit? If yes, where when, and reason for visit? NO      3) Do you have an Advanced Directive/ Living Will in place? YES  If yes, do we have a copy on file YES  If no, would you like information NO      Learning Assessment 10/24/2014   PRIMARY LEARNER Patient   CO-LEARNER CAREGIVER No   PRIMARY LANGUAGE ENGLISH   LEARNER PREFERENCE PRIMARY LISTENING   ANSWERED BY patient   RELATIONSHIP SELF        3 most recent PHQ Screens 2020   Little interest or pleasure in doing things Several days   Feeling down, depressed, irritable, or hopeless Several days   Total Score PHQ 2 2        Abuse Screening Questionnaire 2020   Do you ever feel afraid of your partner? N   Are you in a relationship with someone who physically or mentally threatens you? N   Is it safe for you to go home? Y        Fall Risk Assessment, last 12 mths 2020   Able to walk? Yes   Fall in past 12 months?  No

## 2020-02-28 ENCOUNTER — OFFICE VISIT (OUTPATIENT)
Dept: CARDIOLOGY CLINIC | Age: 65
End: 2020-02-28

## 2020-02-28 VITALS
SYSTOLIC BLOOD PRESSURE: 118 MMHG | HEIGHT: 67 IN | WEIGHT: 154 LBS | HEART RATE: 60 BPM | OXYGEN SATURATION: 100 % | BODY MASS INDEX: 24.17 KG/M2 | DIASTOLIC BLOOD PRESSURE: 80 MMHG

## 2020-02-28 DIAGNOSIS — R00.2 PALPITATIONS: Primary | ICD-10-CM

## 2020-02-28 DIAGNOSIS — I10 ESSENTIAL HYPERTENSION: ICD-10-CM

## 2020-02-28 NOTE — PROGRESS NOTES
Haritha Bedoya presents today for blood pressure follow-up after being started on lisinopril 2.5mg daily in the evening as she complained of higher blood pressures in the late afternoon/early evening. She did not tolerate an increased dose of Zebeta due to increased fatigue and her dose was decreased back to 5mg daily. She states that she has done well on the lisinopril and has noticed improved blood pressures in the evenings and she has not noticed the \"pounding\" in her chest at night. She is a 60year old female with history of palpitations, hyperthyroidism with thyroiditis, GERD, and atypical chest pain. Admits to having vertigo in the past.  She was last seen by Dr. Eden Jon in April 2019. Her last echo was done in 2007 and it showed an EFof 65% and no significant valvular pathology. She wore a 48 hour Holter monitor in Oct. 2017, which showed sinus rhythm and sinus arrhythmia. She presented to the ER at [facility] on 1/3020 with the same complaints she was seen for in the office on 1/14/20. During that visit, I ordered a TSH, free T4, as well as an echocardiogram.  Labs were done during her ER visit and her Thyroid studies were normal.  No EKG changes. The only abnormality was that her blood pressure was quite elevated. After being discharged home from the ER, she called the office to report her blood pressure and her Colton Chock was increased to 10mg daily and asked to return for follow-up as scheduled. She had the echocardiogram done on 2/10/20 and it showed an EF of 55-60%, no regional WMA, grade 1 diastolic dysfunction, mild mitral valve regurgitation, and PASP of 29.5 mmHg. She wore the Holter monitor on 1/16/20 and it showed sinus rhythm to sinus arrhythmia with one ventricular ectopic. Denies chest pain, tightness, heaviness, and admits to less \"pounding\" in her chest at night. Denies shortness of breath at rest, dyspnea on exertion, orthopnea and PND.    Denies abdominal bloating. Admits to occasional lightheadedness with sudden position changes, denies dizziness, and syncope. Denies lower extremity edema and claudication. Denies nausea, vomiting, diarrhea, melena, hematochezia. Denies hematuria, urgency, frequency. Denies fever, chills. PMH:  Past Medical History:   Diagnosis Date    Cardiac echocardiogram 01/30/2007    EF >65%. No significant valvular pathology.  History of gastroesophageal reflux (GERD)     Hyperthyroidism     s/thyroiditis.  Migraine equivalent     Palpitations     w/possible dual pathway on event monitor. PSH:  History reviewed. No pertinent surgical history. MEDS:  Current Outpatient Medications   Medication Sig    bisoprolol (ZEBETA) 5 mg tablet Take 1 Tab by mouth daily.  lisinopril (PRINIVIL, ZESTRIL) 2.5 mg tablet Take 1 Tab by mouth daily.  fluticasone (FLONASE) 50 mcg/actuation nasal spray 2 Sprays by Right Nostril route daily.  meclizine (ANTIVERT) 25 mg tablet Take 25 mg by mouth as needed.  cholecalciferol, vitamin D3, (VITAMIN D3) 2,000 unit tab Take  by mouth.  famotidine (PEPCID) 40 mg tablet Take 40 mg by mouth daily. No current facility-administered medications for this visit. Allergies and Sensitivities:  Allergies   Allergen Reactions    Pseudoephedrine Palpitations    Sulfur Palpitations       Family History:  Family History   Problem Relation Age of Onset    Hypertension Mother     High Cholesterol Mother     Heart Surgery Father        Social History:  She  reports that she has never smoked. She has never used smokeless tobacco.  She  reports no history of alcohol use.       Physical:  Visit Vitals  /80 (BP 1 Location: Left arm, BP Patient Position: Sitting)   Pulse 60   Ht 5' 7\" (1.702 m)   Wt 69.9 kg (154 lb)   SpO2 100%   BMI 24.12 kg/m²       Her weight is unchanged since her last visit      Exam:  Neck:  Supple, no JVD, no carotid bruits  CV:  Normal S1 and  S2, no murmurs, rubs, or gallops noted  Lungs:  Clear to ausculation throughout, no wheezes or rales  Abd:  Soft, non-tender, non-distended with good bowel sounds. No hepatosplenomegaly  Extremities:  No edema      Data:  EKG:        LABS:  Lab Results   Component Value Date/Time    Sodium 138 01/30/2020 09:25 PM    Potassium 3.8 01/30/2020 09:25 PM    Chloride 104 01/30/2020 09:25 PM    CO2 26 01/30/2020 09:25 PM    Glucose 93 01/30/2020 09:25 PM    BUN 17 01/30/2020 09:25 PM    Creatinine 0.8 01/30/2020 09:25 PM     No results found for: CHOL, CHOLX, CHLST, CHOLV, HDL, HDLP, LDL, LDLC, DLDLP, TGLX, TRIGL, TRIGP, CHHD, CHHDX  No results found for: GPT, ALT      Impression/Plan:  1. Palpitations, \"pounding\" sensation, improved  2. GERD  3. Hyperthyroidism, recent labs show normal thyroid function  4. Hypertension, blood pressure now well controlled      Mrs. Raissa Antonio was seen today for blood pressure follow-up after being started on lisinopril 2.5mg daily in the evening due to complaints of elevated blood pressures in the late afternoon/early evening. She has noticed much improved blood pressures in the evening and also states that she has not noticed the \"pounding\" in her chest at night since she began taking the lisinopril. She will continue her present medication regimen. She was given a lab slip for a BMP to be done in 2 weeks. She is pleased with how she feels. She notices slight dizziness at times which only lasts for a few seconds and she states that this is tolerable. When she was on the higher dose of Zebeta, the dizziness was much worse. I asked that she call the office if she has any further problems. She is aware of the side effect of dry cough with her ACEI and I asked her to call us if she experiences it. Follow-up with Dr. Dennis Garcia as scheduled and as needed.       Maude Zaldivar MSN, FNP-BC    Please note:  Portions of this chart were created with [employer] speech to text program.  Unrecognized errors may be present.

## 2020-02-28 NOTE — PATIENT INSTRUCTIONS
Continue present medication regimen  BMP in 2 weeks  Follow-up with Dr Raghav Macdonald as scheduled and as needed

## 2020-03-13 ENCOUNTER — HOSPITAL ENCOUNTER (OUTPATIENT)
Dept: LAB | Age: 65
Discharge: HOME | End: 2020-03-13

## 2020-03-13 LAB — SENTARA SPECIMEN COL,SENBCF: NORMAL

## 2020-03-13 PROCEDURE — 99001 SPECIMEN HANDLING PT-LAB: CPT

## 2020-03-14 LAB
ANION GAP SERPL CALC-SCNC: 13 MMOL/L
BUN SERPL-MCNC: 12 MG/DL (ref 6–22)
CALCIUM SERPL-MCNC: 9.3 MG/DL (ref 8.4–10.5)
CHLORIDE SERPL-SCNC: 103 MMOL/L (ref 98–110)
CO2 SERPL-SCNC: 24 MMOL/L (ref 20–32)
CREAT SERPL-MCNC: 0.6 MG/DL (ref 0.8–1.4)
GFRAA, 66117: >60
GFRNA, 66118: >60
GLUCOSE SERPL-MCNC: 83 MG/DL (ref 70–99)
POTASSIUM SERPL-SCNC: 4.9 MMOL/L (ref 3.5–5.5)
SODIUM SERPL-SCNC: 140 MMOL/L (ref 133–145)

## 2020-04-15 ENCOUNTER — VIRTUAL VISIT (OUTPATIENT)
Dept: CARDIOLOGY CLINIC | Age: 65
End: 2020-04-15

## 2020-04-15 DIAGNOSIS — R00.2 PALPITATIONS: Primary | ICD-10-CM

## 2020-04-15 DIAGNOSIS — I10 ESSENTIAL HYPERTENSION: ICD-10-CM

## 2020-04-15 DIAGNOSIS — E05.90 HYPERTHYROIDISM: ICD-10-CM

## 2020-04-15 NOTE — PROGRESS NOTES
Osei Trejo   63 y.o.  who was seen by synchronous (real-time) audio-video technology on April 15, 2020    Consent:  The patient and/or her healthcare decision maker is aware that this patient-initiated Telehealth encounter is a billable service, with coverage as determined by her insurance carrier. The patient is aware that she may receive a bill and has provided verbal consent to proceed: YES    I was at home while conducting this encounter. HPI: I saw Chucky Ortez in my office today and cardiovascular evaluation with regard to her problems with palpitations for which I followed her for the past several years. Mrs. Freddie Ortez is a pleasant 66-year-old  female who has past history of hyperthyroidism with thyroiditis, gastroesophageal reflux disease, atypical anginal type chest discomforts, and palpitations the clear-cut etiology of which have never been elucidated. However, these palpitations have been well-controlled on low-dose Toprol 25 mg daily for some time. Historically, she did have an echocardiogram back in January 2007 which was completely normal.    She relates that since I saw her a few months back she is really been doing quite well. She has not been having any significant palpitations and actually had only one episode of palpitations lasting for about a minute at night once in the last couple of months so it would appear that her bisoprolol is working well. She did have some problem with her blood pressure a few months back and lisinopril was added, but her blood pressure appears to be well controlled currently. PCP: Deborah Velazquez MD    Encounter Diagnoses   Name Primary?  Palpitations Yes    Essential hypertension     History of hyperthyroidism        Discussion: He appears to be doing very well from a cardiovascular vantage at this time.   She did have some increased palpitations a few months ago but currently her palpitations are rather infrequent well controlled on just a very low-dose of bisoprolol. She relates that her blood pressure has been well controlled recently even though it was higher January 2020 prompting an ER visit but she also had palpitations at that time and since locations have been adjusted she seems to be doing well and since she is not having any other cardiovascular issues I will simply plan to see her again in several months. Coding Help - Use CPT Codes [id number], [id number] for Established and New Patients respectively, either employing EM elements or Time rules. Other codes (example consult codes) may also apply. I spent at least minutes 25 with this established patient, and >50% of the time was spent counseling and/or coordinating care regarding current and future cardiac care. Past Medical History:   Diagnosis Date    Cardiac echocardiogram 01/30/2007    EF >65%. No significant valvular pathology.  History of gastroesophageal reflux (GERD)     Hyperthyroidism     s/thyroiditis.  Migraine equivalent     Palpitations     w/possible dual pathway on event monitor. No past surgical history on file. Current Outpatient Medications   Medication Sig    bisoprolol (ZEBETA) 5 mg tablet Take 1 Tab by mouth daily.  lisinopril (PRINIVIL, ZESTRIL) 2.5 mg tablet Take 1 Tab by mouth daily.  fluticasone (FLONASE) 50 mcg/actuation nasal spray 2 Sprays by Right Nostril route daily.  meclizine (ANTIVERT) 25 mg tablet Take 25 mg by mouth as needed.  cholecalciferol, vitamin D3, (VITAMIN D3) 2,000 unit tab Take  by mouth.  famotidine (PEPCID) 40 mg tablet Take 40 mg by mouth daily. No current facility-administered medications for this visit. No Known Allergies       ROS -as except as per above with negative.   It should be noted that she did have some issues with blood pressure in the past for which she had lisinopril added to her regimen but currently her blood pressure is doing quite well on the bisoprolol and lisinopril combination. Vital Signs: (As obtained by patient/caregiver at home)  113/67 taken by the patient last evening       We discussed the expected course, resolution and complications of the diagnosis(es) in detail. Medication risks, benefits, costs, interactions, and alternatives were discussed as indicated. I advised her to contact the office if her condition worsens, changes or fails to improve as anticipated. She expressed understanding with the diagnosis(es) and plan. Pursuant to the emergency declaration under the [facility], [facility] waiver authority and the [employer] and [employer] Act, this Virtual  Visit was conducted, with patient's consent, to reduce the patient's risk of exposure to COVID-19 and provide continuity of care for an established patient. Services were provided through a video synchronous discussion virtually to substitute for in-person clinic visit. Giovanny Simpson D.O., F.A.C.C. [facility]  [facility] [place]  [address] [place] [address].   [address] [facility]

## 2020-10-12 ENCOUNTER — OFFICE VISIT (OUTPATIENT)
Dept: CARDIOLOGY CLINIC | Age: 65
End: 2020-10-12
Payer: COMMERCIAL

## 2020-10-12 VITALS
OXYGEN SATURATION: 98 % | HEART RATE: 55 BPM | BODY MASS INDEX: 25.27 KG/M2 | WEIGHT: 161 LBS | DIASTOLIC BLOOD PRESSURE: 92 MMHG | SYSTOLIC BLOOD PRESSURE: 150 MMHG | HEIGHT: 67 IN

## 2020-10-12 DIAGNOSIS — E05.90 HYPERTHYROIDISM: ICD-10-CM

## 2020-10-12 DIAGNOSIS — R00.2 PALPITATIONS: Primary | ICD-10-CM

## 2020-10-12 DIAGNOSIS — I10 ESSENTIAL HYPERTENSION: ICD-10-CM

## 2020-10-12 PROCEDURE — 93000 ELECTROCARDIOGRAM COMPLETE: CPT | Performed by: INTERNAL MEDICINE

## 2020-10-12 PROCEDURE — 99214 OFFICE O/P EST MOD 30 MIN: CPT | Performed by: INTERNAL MEDICINE

## 2020-10-12 RX ORDER — BISOPROLOL FUMARATE 5 MG/1
5 TABLET ORAL DAILY
Qty: 30 TAB | Refills: 6 | Status: SHIPPED | OUTPATIENT
Start: 2020-10-12 | End: 2021-05-14 | Stop reason: SDUPTHER

## 2020-10-12 NOTE — PROGRESS NOTES
HPI:  I saw Mallory Davenport in my office today and cardiovascular evaluation with regard to her problems with palpitations for which I followed her for the past several years. Mrs. Teodoro Davenport is a pleasant 53-year-old  female who has past history of hyperthyroidism with thyroiditis, gastroesophageal reflux disease, atypical anginal type chest discomforts, and palpitations the clear-cut etiology of which have never been elucidated. However, these palpitations have been well-controlled on low-dose Toprol 25 mg daily for some time so this was switched to bisoprolol within the last couple of years. Historically, she did have an echocardiogram back in January 2007 which was completely normal.    She comes in today and relates is really not having any significant palpitations and the only thing that she has noted was that her blood pressures been a little high recently because she had had some problems with dizziness and chest tightness with her lisinopril which she discontinued a few months ago. She denies any other cardiovascular complaints. Encounter Diagnoses   Name Primary?  Palpitations Yes    Essential hypertension     History of hyperthyroidism        Discussion: This patient appears to be doing reasonably well in terms of palpitations with really no significant issues now on bisoprolol in place of Toprol which had been giving her some side effects in the past.  She has discontinued her lisinopril and her blood pressure is somewhat elevated today at 150/92 when checked by my staff 155/80 when checked by myself. She is going to check her blood pressures at home and if they are staying above 713 systolic primarily she will go back on her lisinopril and then follow-up with her family physician.     She does have history of hyperthyroidism at one point in the past secondary to thyroiditis but I do not see any recent thyroid testing, but she is not having any real symptoms and I will leave follow-up and management of that to her primary care team.     Since  should she is otherwise doing well I will simply have her follow-up in several months to a year with my associate Dr. Shahram Sweeney since I will be retiring. PCP: Gray Dawn MD      Past Medical History:   Diagnosis Date    Cardiac echocardiogram 01/30/2007    EF >65%. No significant valvular pathology.  History of gastroesophageal reflux (GERD)     Hyperthyroidism     s/thyroiditis.  Migraine equivalent     Palpitations     w/possible dual pathway on event monitor. No past surgical history on file. Current Outpatient Medications   Medication Sig    bisoprolol (ZEBETA) 5 mg tablet Take 1 Tab by mouth daily.  fluticasone (FLONASE) 50 mcg/actuation nasal spray 2 Sprays by Right Nostril route daily.  cholecalciferol, vitamin D3, (VITAMIN D3) 2,000 unit tab Take  by mouth.  famotidine (PEPCID) 40 mg tablet Take 40 mg by mouth daily.  lisinopril (PRINIVIL, ZESTRIL) 2.5 mg tablet Take 1 Tab by mouth daily.  meclizine (ANTIVERT) 25 mg tablet Take 25 mg by mouth as needed. No current facility-administered medications for this visit. Allergies   Allergen Reactions    Pseudoephedrine Palpitations    Sulfur Palpitations       Social History :  Social History     Tobacco Use    Smoking status: Never Smoker    Smokeless tobacco: Never Used   Substance Use Topics    Alcohol use: No        Family History: family history includes Heart Surgery in her father; High Cholesterol in her mother; Hypertension in her mother. Review of Systems:   Constitutional: Positive for malaise/fatigue. Negative for chills, fever and weight loss. Respiratory: Negative. Cardiovascular: Positive for palpitations. Negative for chest pain, orthopnea and leg swelling. Gastrointestinal: Negative. Musculoskeletal: Positive for joint pain. Negative for falls and myalgias. Neurological: Positive for dizziness. Physical Exam:    The patient is a cooperative, alert, well developed, well nourished 63 y.o. asthenic appearing   female who is in no acute distress at the time of the examination. Visit Vitals  BP (!) 150/92   Pulse (!) 55   Ht 5' 7\" (1.702 m)   Wt 73 kg (161 lb)   SpO2 98%   BMI 25.22 kg/m²       HEENT: Conjuctiva white, mucosa moist, no pallor or cyanosis. NECK: Supple without masses, tenderness or thyromegaly. There was no jugular venous distention. Carotid are full bilaterally without bruits. CHEST: Symmetrical with good excursion. LUNGS: Clear to auscultation in all fields. HEART: The apex is not displaced. There were no lifts, thrills or heaves. There is a normal S1 and S2 without appreciable murmurs, rubs, clicks, or gallops auscultated. ABDOMEN: Soft without masses, tenderness or organomegaly. EXTREMITIES: Full peripheral pulses without peripheral edema. INTEGUMENT: Warm and dry   NEUROLOGICAL: The patient is oriented x 3 with motor function grossly intact. Review of Data: See PMH and Cardiology and Imaging sections for cardiac testing      Results for orders placed or performed in visit on 10/12/20   AMB POC EKG ROUTINE W/ 12 LEADS, INTER & REP     Status: None    Narrative    Sinus bradycardia, rate 55. First-degree AV block. Possible right atrial enlargement. RSR prime normal variant in leads V1 and V2. Compared to the EKG of February 28, 2020 there was little interval change. Kota Olivarez D.O., F.A.C.C. [facility] Specialists  [facility] [place]  [address] [place], [place] [address]. [address] [place] [address]    PLEASE NOTE:  This document has been produced using voice recognition software. Unrecognized errors in transcription may be present.

## 2021-05-14 RX ORDER — BISOPROLOL FUMARATE 5 MG/1
5 TABLET ORAL DAILY
Qty: 30 TAB | Refills: 6 | Status: SHIPPED | OUTPATIENT
Start: 2021-05-14 | End: 2021-12-21

## 2021-06-23 ENCOUNTER — OFFICE VISIT (OUTPATIENT)
Dept: CARDIOLOGY CLINIC | Age: 66
End: 2021-06-23
Payer: COMMERCIAL

## 2021-06-23 VITALS
HEART RATE: 55 BPM | BODY MASS INDEX: 24.33 KG/M2 | WEIGHT: 155 LBS | OXYGEN SATURATION: 99 % | SYSTOLIC BLOOD PRESSURE: 140 MMHG | HEIGHT: 67 IN | DIASTOLIC BLOOD PRESSURE: 80 MMHG

## 2021-06-23 DIAGNOSIS — I10 ESSENTIAL HYPERTENSION: ICD-10-CM

## 2021-06-23 DIAGNOSIS — R00.2 PALPITATIONS: Primary | ICD-10-CM

## 2021-06-23 DIAGNOSIS — E05.90 HYPERTHYROIDISM: ICD-10-CM

## 2021-06-23 PROCEDURE — 99214 OFFICE O/P EST MOD 30 MIN: CPT | Performed by: INTERNAL MEDICINE

## 2021-06-23 PROCEDURE — 93000 ELECTROCARDIOGRAM COMPLETE: CPT | Performed by: INTERNAL MEDICINE

## 2021-06-23 NOTE — PROGRESS NOTES
History of Present Illness:  61year old female here to establish care. She previously had followed with Dr. Gisel Harp. Overall she has been doing relatively well since seen last October. No new chest pain, dyspnea, PND, orthopnea or edema. She continues to work in customer services, fairly stressful, but again, has had minimal palpitations. She has had some issues with hypotension, previously with Lisinopril. Since stopping and taking Bisoprolol 5 mg daily, her home systolic blood pressure is very well controlled in the 110s. No new chest pain, dyspnea, PND, orthopnea or edema. Impression:  1. History of palpitations, controlled with Bisoprolol. 2. History of hypertension, increased in the office today, but well controlled at home. We will continue to monitor. She was intolerant to the Lisinopril due to hypotension. 3. History of remote thyroiditis, resolved. Follow up normal.  4. Echocardiogram February, 2020 with normal function. 5. History of GERD. Plan:  Her palpitations are stable. Blood pressure is high in the office, but controlled at home. We will continue to monitor, and if it becomes more elevated at home, it may be reasonable to restart a low dose Lisinopril. She does have mild sinus bradycardia today, but no symptoms. She is hoping to retire within the next year when I see her back. All questions answered. Past Medical History:   Diagnosis Date    Cardiac echocardiogram 01/30/2007    EF >65%. No significant valvular pathology.  History of gastroesophageal reflux (GERD)     Hyperthyroidism     s/thyroiditis.  Migraine equivalent     Palpitations     w/possible dual pathway on event monitor. Current Outpatient Medications   Medication Sig Dispense Refill    bisoprolol (ZEBETA) 5 mg tablet Take 1 Tab by mouth daily. 30 Tab 6    fluticasone (FLONASE) 50 mcg/actuation nasal spray 2 Sprays by Right Nostril route daily.   4    cholecalciferol, vitamin D3, (VITAMIN D3) 2,000 unit tab Take  by mouth.  famotidine (PEPCID) 40 mg tablet Take 40 mg by mouth daily. Social History   reports that she has never smoked. She has never used smokeless tobacco.   reports no history of alcohol use. Family History  family history includes Heart Surgery in her father; High Cholesterol in her mother; Hypertension in her mother. Review of Systems  Except as stated above include:  Constitutional: Negative for fever, chills and malaise/fatigue. HEENT: No congestion or recent URI. Gastrointestinal: No nausea, vomiting, abdominal pain, bloody stools. Pulmonary:  Negative except as stated above. Cardiac:  Negative except as stated above. Musculoskeletal: Negative except as stated above. Neurological:  No localized symptoms. Skin:  Negative except as stated above. Psych:  Negative except as stated above. Endocrine:  Negative except as stated above. PHYSICAL EXAM  BP Readings from Last 3 Encounters:   06/23/21 (!) 140/80   10/12/20 (!) 150/92   02/28/20 118/80     Pulse Readings from Last 3 Encounters:   06/23/21 (!) 55   10/12/20 (!) 55   02/28/20 60     Wt Readings from Last 3 Encounters:   06/23/21 70.3 kg (155 lb)   10/12/20 73 kg (161 lb)   02/28/20 69.9 kg (154 lb)     General:   Well developed, well groomed. Head/Neck:   No obvious jugular venous distention     No obvious carotid pulsations. No evidence of xanthelasma. Lungs:   No respiratory distress. Clear bilaterally. Heart:  Regular rate and rhythm. Normal S1/S2. Palpation grossly normal.    No significant murmurs, rubs or gallops. Abdomen:   Non-acute abdomen. No obvious pulsations. Extremities:   Intact peripheral pulses. No significant edema. Neurological:   Alert and oriented to person, place, time. No focal neurological deficit visually. Skin:   No obvious rash    Blood Pressure Metric:  Monitor recommended and adjustments stated if needed.

## 2021-06-23 NOTE — PROGRESS NOTES
Jacinda Bray presents today for   Chief Complaint   Patient presents with    Follow-up     prev skillen pt., 8 month follow up       Jacinda Bray preferred language for health care discussion is english/other. Is someone accompanying this pt? no    Is the patient using any DME equipment during [address]? no    Depression Screening:  3 most recent PHQ Screens 6/23/2021   Little interest or pleasure in doing things Not at all   Feeling down, depressed, irritable, or hopeless Not at all   Total Score PHQ 2 0       Learning Assessment:  Learning Assessment 6/23/2021   PRIMARY LEARNER Patient   CO-LEARNER CAREGIVER -   PRIMARY LANGUAGE ENGLISH   LEARNER PREFERENCE PRIMARY DEMONSTRATION   ANSWERED BY patient   RELATIONSHIP SELF       Abuse Screening:  Abuse Screening Questionnaire 6/23/2021   Do you ever feel afraid of your partner? N   Are you in a relationship with someone who physically or mentally threatens you? N   Is it safe for you to go home? Y       Fall Risk  Fall Risk Assessment, last 12 mths 1/14/2020   Able to walk? Yes   Fall in past 12 months? No           Pt currently taking Anticoagulant therapy? no    Pt currently taking Antiplatelet therapy ? no      Coordination of Care:  1. Have you been to the ER, urgent care clinic since your last visit? Hospitalized since your last visit? no    2. Have you seen or consulted any other health care providers outside of the [address] [place] [address] since your last visit? Include any pap smears or colon screening.  no

## 2021-12-21 RX ORDER — BISOPROLOL FUMARATE 5 MG/1
TABLET ORAL
Qty: 30 TABLET | Refills: 6 | Status: SHIPPED | OUTPATIENT
Start: 2021-12-21 | End: 2022-07-07 | Stop reason: SDUPTHER

## 2022-07-07 ENCOUNTER — OFFICE VISIT (OUTPATIENT)
Dept: CARDIOLOGY CLINIC | Age: 67
End: 2022-07-07
Payer: COMMERCIAL

## 2022-07-07 VITALS
BODY MASS INDEX: 24.96 KG/M2 | DIASTOLIC BLOOD PRESSURE: 88 MMHG | HEIGHT: 67 IN | SYSTOLIC BLOOD PRESSURE: 128 MMHG | WEIGHT: 159 LBS | HEART RATE: 60 BPM | OXYGEN SATURATION: 98 %

## 2022-07-07 DIAGNOSIS — E05.90 HYPERTHYROIDISM: ICD-10-CM

## 2022-07-07 DIAGNOSIS — R00.2 PALPITATIONS: Primary | ICD-10-CM

## 2022-07-07 DIAGNOSIS — I10 ESSENTIAL HYPERTENSION: ICD-10-CM

## 2022-07-07 PROCEDURE — 93000 ELECTROCARDIOGRAM COMPLETE: CPT | Performed by: INTERNAL MEDICINE

## 2022-07-07 PROCEDURE — 99214 OFFICE O/P EST MOD 30 MIN: CPT | Performed by: INTERNAL MEDICINE

## 2022-07-07 PROCEDURE — 1123F ACP DISCUSS/DSCN MKR DOCD: CPT | Performed by: INTERNAL MEDICINE

## 2022-07-07 RX ORDER — BISOPROLOL FUMARATE 5 MG/1
5 TABLET ORAL DAILY
Qty: 30 TABLET | Refills: 6 | Status: SHIPPED | OUTPATIENT
Start: 2022-07-07

## 2022-07-07 NOTE — PROGRESS NOTES
History of Present Illness:  74 YOF here for follow up. She established care with me from Dr. Kerry Fitzpatrick last year. She has been doing relatively well. She is retiring in a couple weeks. Her  works as a  and she is planning to get a part time job. No new chest pain, dyspnea, PND, orthopnea or edema, rare palpitations, usually worse with chocolate, and some occasional vertigo and dizziness. It is not associated with the palpitations. Otherwise no new chest pain, dyspnea, PND, orthopnea or edema. Impression:  1. History of palpitations, controlled with Bystolic 5 mg daily. 2. History of mild HTN, well controlled today. Intolerant to Lisinopril due to hypotension. 3. History of remote thyroiditis, resolved. 4. Echo February 2020 with normal function. 5. History of GERD, stable. Plan:  She is retiring in the next couple weeks and will try to find a part time job. She continues to stay active without limitation. Blood pressure is controlled. Chocolate seems to make some of the palpitations worse, which she will avoid. All questions answered and I will see back in a year. Past Medical History:   Diagnosis Date    Cardiac echocardiogram 01/30/2007    EF >65%. No significant valvular pathology.  History of gastroesophageal reflux (GERD)     Hyperthyroidism     s/thyroiditis.  Migraine equivalent     Palpitations     w/possible dual pathway on event monitor. Current Outpatient Medications   Medication Sig Dispense Refill    bisoprolol (ZEBETA) 5 mg tablet TAKE 1 TABLET BY MOUTH ONCE DAILY 30 Tablet 6    fluticasone (FLONASE) 50 mcg/actuation nasal spray 2 Sprays by Right Nostril route daily. 4    cholecalciferol, vitamin D3, (VITAMIN D3) 2,000 unit tab Take 2,000 Units by mouth daily.  famotidine (PEPCID) 40 mg tablet Take 40 mg by mouth daily. Social History   reports that she has never smoked.  She has never used smokeless tobacco.   reports no history of alcohol use. Family History  family history includes Heart Surgery in her father; High Cholesterol in her mother; Hypertension in her mother. Review of Systems  Except as stated above include:  Constitutional: Negative for fever, chills and malaise/fatigue. HEENT: No congestion or recent URI. Gastrointestinal: No nausea, vomiting, abdominal pain, bloody stools. Pulmonary:  Negative except as stated above. Cardiac:  Negative except as stated above. Musculoskeletal: Negative except as stated above. Neurological:  No localized symptoms. Skin:  Negative except as stated above. Psych:  Negative except as stated above. Endocrine:  Negative except as stated above. PHYSICAL EXAM  BP Readings from Last 3 Encounters:   07/07/22 128/88   06/23/21 (!) 140/80   10/12/20 (!) 150/92     Pulse Readings from Last 3 Encounters:   07/07/22 60   06/23/21 (!) 55   10/12/20 (!) 55     Wt Readings from Last 3 Encounters:   07/07/22 72.1 kg (159 lb)   06/23/21 70.3 kg (155 lb)   10/12/20 73 kg (161 lb)     General:   Well developed, well groomed. Head/Neck:   No obvious jugular venous distention     No obvious carotid pulsations. No evidence of xanthelasma. Lungs:   No respiratory distress. Clear bilaterally. Heart:  Regular rate and rhythm. Normal S1/S2. Palpation grossly normal.    No significant murmurs, rubs or gallops. Abdomen:   Non-acute abdomen. No obvious pulsations. Extremities:   Intact peripheral pulses. No significant edema. Neurological:   Alert and oriented to person, place, time. No focal neurological deficit visually. Skin:   No obvious rash    Blood Pressure Metric:  Monitor recommended and adjustments stated if needed.

## 2022-07-07 NOTE — PROGRESS NOTES
Wild Martell presents today for   Chief Complaint   Patient presents with    Follow-up     1 year follow up       Wild Moura preferred language for health care discussion is english/other. Is someone accompanying this pt? no    Is the patient using any DME equipment during [address]? no    Depression Screening:  3 most recent PHQ Screens 7/7/2022   Little interest or pleasure in doing things Not at all   Feeling down, depressed, irritable, or hopeless Not at all   Total Score PHQ 2 0       Learning Assessment:  Learning Assessment 7/7/2022   PRIMARY LEARNER Patient   CO-LEARNER CAREGIVER -   PRIMARY LANGUAGE ENGLISH   LEARNER PREFERENCE PRIMARY DEMONSTRATION   ANSWERED BY patient   RELATIONSHIP SELF       Abuse Screening:  Abuse Screening Questionnaire 7/7/2022   Do you ever feel afraid of your partner? N   Are you in a relationship with someone who physically or mentally threatens you? N   Is it safe for you to go home? Y       Fall Risk  Fall Risk Assessment, last 12 mths 7/7/2022   Able to walk? Yes   Fall in past 12 months? 0   Do you feel unsteady? 0   Are you worried about falling 0           Pt currently taking Anticoagulant therapy? no    Pt currently taking Antiplatelet therapy ? no      Coordination of Care:  1. Have you been to the ER, urgent care clinic since your last visit? Hospitalized since your last visit? no    2. Have you seen or consulted any other health care providers outside of the [address] [place] [address] since your last visit? Include any pap smears or colon screening.  no

## 2023-01-05 ENCOUNTER — TRANSCRIBE ORDER (OUTPATIENT)
Dept: SCHEDULING | Age: 68
End: 2023-01-05

## 2023-01-05 DIAGNOSIS — Z12.31 VISIT FOR SCREENING MAMMOGRAM: Primary | ICD-10-CM

## 2023-01-05 DIAGNOSIS — Z78.0 POST-MENOPAUSAL: Primary | ICD-10-CM

## 2023-02-01 ENCOUNTER — HOSPITAL ENCOUNTER (OUTPATIENT)
Dept: MAMMOGRAPHY | Age: 68
Discharge: HOME | End: 2023-02-01
Attending: FAMILY MEDICINE
Payer: MEDICARE

## 2023-02-01 ENCOUNTER — HOSPITAL ENCOUNTER (OUTPATIENT)
Dept: BONE DENSITY | Age: 68
Discharge: HOME | End: 2023-02-01
Attending: FAMILY MEDICINE
Payer: MEDICARE

## 2023-02-01 DIAGNOSIS — Z12.31 VISIT FOR SCREENING MAMMOGRAM: Primary | ICD-10-CM

## 2023-02-01 DIAGNOSIS — Z12.31 VISIT FOR SCREENING MAMMOGRAM: ICD-10-CM

## 2023-02-01 DIAGNOSIS — Z78.0 POST-MENOPAUSAL: Primary | ICD-10-CM

## 2023-02-01 DIAGNOSIS — Z78.0 POST-MENOPAUSAL: ICD-10-CM

## 2023-02-01 PROCEDURE — 77080 DXA BONE DENSITY AXIAL: CPT

## 2023-02-03 ENCOUNTER — TRANSCRIBE ORDER (OUTPATIENT)
Dept: SCHEDULING | Age: 68
End: 2023-02-03

## 2023-02-03 DIAGNOSIS — N64.4 MASTODYNIA: Primary | ICD-10-CM

## 2023-02-04 DIAGNOSIS — Z12.31 VISIT FOR SCREENING MAMMOGRAM: Primary | ICD-10-CM

## 2023-02-05 DIAGNOSIS — Z12.31 VISIT FOR SCREENING MAMMOGRAM: Primary | ICD-10-CM

## 2023-02-06 ENCOUNTER — TRANSCRIBE ORDER (OUTPATIENT)
Dept: SCHEDULING | Age: 68
End: 2023-02-06

## 2023-02-06 ENCOUNTER — TRANSCRIBE ORDERS (OUTPATIENT)
Facility: HOSPITAL | Age: 68
End: 2023-02-06

## 2023-02-06 DIAGNOSIS — N64.4 MASTODYNIA: Primary | ICD-10-CM

## 2023-02-16 ENCOUNTER — HOSPITAL ENCOUNTER (OUTPATIENT)
Dept: WOMENS IMAGING | Facility: HOSPITAL | Age: 68
Discharge: HOME | End: 2023-02-16
Payer: MEDICARE

## 2023-02-16 ENCOUNTER — HOSPITAL ENCOUNTER (OUTPATIENT)
Facility: HOSPITAL | Age: 68
End: 2023-02-16
Payer: MEDICARE

## 2023-02-16 DIAGNOSIS — N64.4 MASTODYNIA: ICD-10-CM

## 2023-02-16 PROCEDURE — G0279 TOMOSYNTHESIS, MAMMO: HCPCS

## 2023-02-23 DIAGNOSIS — N64.4 MASTODYNIA: Primary | ICD-10-CM

## 2023-03-03 RX ORDER — BISOPROLOL FUMARATE 5 MG/1
5 TABLET, FILM COATED ORAL DAILY
Qty: 90 TABLET | Refills: 5 | Status: SHIPPED | OUTPATIENT
Start: 2023-03-03

## 2023-03-03 RX ORDER — BISOPROLOL FUMARATE 5 MG/1
TABLET, FILM COATED ORAL
Qty: 90 TABLET | Refills: 3 | Status: SHIPPED | OUTPATIENT
Start: 2023-03-03

## 2023-07-06 ENCOUNTER — OFFICE VISIT (OUTPATIENT)
Age: 68
End: 2023-07-06

## 2023-07-06 VITALS
HEART RATE: 95 BPM | OXYGEN SATURATION: 99 % | BODY MASS INDEX: 23.96 KG/M2 | WEIGHT: 153 LBS | DIASTOLIC BLOOD PRESSURE: 92 MMHG | SYSTOLIC BLOOD PRESSURE: 150 MMHG

## 2023-07-06 DIAGNOSIS — R00.2 PALPITATION: Primary | ICD-10-CM

## 2023-07-06 NOTE — PROGRESS NOTES
History of Present Illness:  79 YOF here for follow up. She established care with me a couple years ago. She started a part time job last summer, but her daughter was quite ill in March in the hospital and she was under a lot of stress and her mood was affected. She has tried to get back to doing some regular exercise and had some spasms and pain around her lower ribs, but not under the chest and not worse with exertion. We talked about a stress test versus echo, but at this time we decided to monitor. Blood pressure is also a little high today and she has had some pulsation sensations in her left side and she is seeing a primary doctor tomorrow. Impression:  History of palpitations, reasonably controlled on Bystolic. Recent social stressor with illness with her daughter and some mood disturbance and she is going to follow up with her primary. Atypical chest pain around her ribs, not exertional.  I talked about a stress test or echocardiogram.  At this point she wants to monitor, but if it persists she is going to let me know and I will proceed with testing. Last echocardiogram in 2020 with normal function. History of GERD. Remote thyroiditis. Plan: At this point we will continue to monitor blood pressure at home and she will let me know or Dr. Catherine Garcia if it remains elevated and we can make an adjustment with the Bystolic. If her atypical lower chest pain continues, she will let me know and I would proceed to echocardiogram and stress test.  All questions answered and I will tentatively see back in six months. Wt Readings from Last 3 Encounters:   07/06/23 153 lb (69.4 kg)   07/07/22 159 lb (72.1 kg)   06/23/21 155 lb (70.3 kg)     Past Medical History:   Diagnosis Date    History of echocardiogram 01/30/2007    EF >65%. No significant valvular pathology. History of gastroesophageal reflux (GERD)     Hyperthyroidism     s/thyroiditis.     Migraine equivalent     Palpitations

## 2024-01-10 ENCOUNTER — OFFICE VISIT (OUTPATIENT)
Age: 69
End: 2024-01-10
Payer: MEDICARE

## 2024-01-10 VITALS
HEART RATE: 53 BPM | BODY MASS INDEX: 23.7 KG/M2 | SYSTOLIC BLOOD PRESSURE: 148 MMHG | DIASTOLIC BLOOD PRESSURE: 72 MMHG | OXYGEN SATURATION: 100 % | WEIGHT: 151 LBS | HEIGHT: 67 IN

## 2024-01-10 DIAGNOSIS — R00.2 PALPITATION: Primary | ICD-10-CM

## 2024-01-10 DIAGNOSIS — I10 ESSENTIAL (PRIMARY) HYPERTENSION: ICD-10-CM

## 2024-01-10 PROCEDURE — G8399 PT W/DXA RESULTS DOCUMENT: HCPCS | Performed by: INTERNAL MEDICINE

## 2024-01-10 PROCEDURE — G8484 FLU IMMUNIZE NO ADMIN: HCPCS | Performed by: INTERNAL MEDICINE

## 2024-01-10 PROCEDURE — 3017F COLORECTAL CA SCREEN DOC REV: CPT | Performed by: INTERNAL MEDICINE

## 2024-01-10 PROCEDURE — 99214 OFFICE O/P EST MOD 30 MIN: CPT | Performed by: INTERNAL MEDICINE

## 2024-01-10 PROCEDURE — 93000 ELECTROCARDIOGRAM COMPLETE: CPT | Performed by: INTERNAL MEDICINE

## 2024-01-10 PROCEDURE — 1090F PRES/ABSN URINE INCON ASSESS: CPT | Performed by: INTERNAL MEDICINE

## 2024-01-10 PROCEDURE — G8428 CUR MEDS NOT DOCUMENT: HCPCS | Performed by: INTERNAL MEDICINE

## 2024-01-10 PROCEDURE — 3078F DIAST BP <80 MM HG: CPT | Performed by: INTERNAL MEDICINE

## 2024-01-10 PROCEDURE — G8420 CALC BMI NORM PARAMETERS: HCPCS | Performed by: INTERNAL MEDICINE

## 2024-01-10 PROCEDURE — 1036F TOBACCO NON-USER: CPT | Performed by: INTERNAL MEDICINE

## 2024-01-10 PROCEDURE — 3077F SYST BP >= 140 MM HG: CPT | Performed by: INTERNAL MEDICINE

## 2024-01-10 PROCEDURE — 1123F ACP DISCUSS/DSCN MKR DOCD: CPT | Performed by: INTERNAL MEDICINE

## 2024-01-10 NOTE — PROGRESS NOTES
History of Present Illness:  68 YOF here for follow up.  She established care with me a few years ago.  When I saw her in July, she was having some lower rib pain at times and we talked about a possible stress test, but this has improved. Her blood pressure had been somewhat higher at times, but at home it is now well controlled.  She does have white coat syndrome, it appears.  No new chest pain, dyspnea, PND, orthopnea or edema.    Impression:  History of palpitations, reasonably controlled on Bystolic.  Atypical chest pain in her ribs, non exertional, improved.  We talked about a stress test and echocardiogram follow up and again it is better.  Echocardiogram in 2020 with normal function.  GERD.  History of thyroiditis.  White coat syndrome.    Plan:  Blood pressure is high in the office, but well controlled at home on Bystolic.  Palpitations are controlled.  Her chest pain has resolved.  Her GERD is now stable.  All questions answered and I will move back to an annual basis.        Wt Readings from Last 3 Encounters:   01/10/24 68.5 kg (151 lb)   07/06/23 69.4 kg (153 lb)   07/07/22 72.1 kg (159 lb)     Past Medical History:   Diagnosis Date    History of echocardiogram 01/30/2007    EF >65%.  No significant valvular pathology.    History of gastroesophageal reflux (GERD)     Hyperthyroidism     s/thyroiditis.    Migraine equivalent     Palpitations     w/possible dual pathway on event monitor.       Current Outpatient Medications   Medication Sig Dispense Refill    bisoprolol (ZEBETA) 5 MG tablet Take 1 tablet by mouth daily 90 tablet 5    Cholecalciferol 50 MCG (2000 UT) TABS Take 1 tablet by mouth daily      famotidine (PEPCID) 40 MG tablet Take 1 tablet by mouth daily      fluticasone (FLONASE) 50 MCG/ACT nasal spray 2 sprays by Nasal route daily       No current facility-administered medications for this visit.       Social History   reports that she has never smoked. She has never used smokeless tobacco.

## 2024-03-08 RX ORDER — BISOPROLOL FUMARATE 5 MG/1
5 TABLET, FILM COATED ORAL DAILY
Qty: 90 TABLET | Refills: 3 | Status: SHIPPED | OUTPATIENT
Start: 2024-03-08

## 2024-03-13 ENCOUNTER — HOSPITAL ENCOUNTER (OUTPATIENT)
Facility: HOSPITAL | Age: 69
Discharge: HOME | End: 2024-03-16
Attending: FAMILY MEDICINE
Payer: MEDICARE

## 2024-03-13 VITALS — HEIGHT: 67 IN | WEIGHT: 151.01 LBS | BODY MASS INDEX: 23.7 KG/M2

## 2024-03-13 DIAGNOSIS — Z12.31 VISIT FOR SCREENING MAMMOGRAM: ICD-10-CM

## 2024-03-13 PROCEDURE — 77063 BREAST TOMOSYNTHESIS BI: CPT

## 2024-07-24 NOTE — PROGRESS NOTES
[facility] Call Center    Phone Message    May a detailed message be left on voicemail: yes     Reason for Call: Medication Question or concern regarding medication   Prescription Clarification  Name of Medication: testosterone cypionate (DEPOTESTOSTERONE) 200 MG/ML injection  Prescribing Provider: Freya Cortés    Pharmacy: [facility] - [place], [place] - [address] [place] [address]     What on the order needs clarification? Pt is wondering if he could get any refills for the following med and if [facility] could [facility].       Action Taken: Other: Endo     Travel Screening: Not Applicable     Date of Service: 7/24/24                                                                      Per your last office note: She does have history of hyperthyroidism at one point in the past secondary to thyroiditis but I do not see any recent thyroid testing and I think getting a TSH on her may help us sort out whether any recurrent hyperthyroid issues might be making her palpitations worse.

## 2025-03-05 NOTE — TELEPHONE ENCOUNTER
Bisoprolol 5 mg 90 day; please send to [facility]. pt states she will be out and do not have an appt until she gets her schedule from work .

## 2025-03-12 ENCOUNTER — TRANSCRIBE ORDERS (OUTPATIENT)
Facility: HOSPITAL | Age: 70
End: 2025-03-12

## 2025-03-12 DIAGNOSIS — Z12.31 ENCOUNTER FOR SCREENING MAMMOGRAM FOR MALIGNANT NEOPLASM OF BREAST: Primary | ICD-10-CM

## 2025-03-12 RX ORDER — BISOPROLOL FUMARATE 5 MG/1
5 TABLET, FILM COATED ORAL DAILY
Qty: 90 TABLET | Refills: 1 | Status: SHIPPED | OUTPATIENT
Start: 2025-03-12

## 2025-05-28 ENCOUNTER — OFFICE VISIT (OUTPATIENT)
Age: 70
End: 2025-05-28
Payer: MEDICARE

## 2025-05-28 VITALS
OXYGEN SATURATION: 98 % | HEART RATE: 88 BPM | DIASTOLIC BLOOD PRESSURE: 70 MMHG | BODY MASS INDEX: 24.48 KG/M2 | WEIGHT: 156 LBS | SYSTOLIC BLOOD PRESSURE: 100 MMHG | HEIGHT: 67 IN

## 2025-05-28 DIAGNOSIS — R00.2 PALPITATION: Primary | ICD-10-CM

## 2025-05-28 DIAGNOSIS — I10 PRIMARY HYPERTENSION: ICD-10-CM

## 2025-05-28 DIAGNOSIS — I34.0 MITRAL VALVE INSUFFICIENCY, UNSPECIFIED ETIOLOGY: ICD-10-CM

## 2025-05-28 PROCEDURE — 3078F DIAST BP <80 MM HG: CPT | Performed by: INTERNAL MEDICINE

## 2025-05-28 PROCEDURE — 3074F SYST BP LT 130 MM HG: CPT | Performed by: INTERNAL MEDICINE

## 2025-05-28 PROCEDURE — 1126F AMNT PAIN NOTED NONE PRSNT: CPT | Performed by: INTERNAL MEDICINE

## 2025-05-28 PROCEDURE — 99214 OFFICE O/P EST MOD 30 MIN: CPT | Performed by: INTERNAL MEDICINE

## 2025-05-28 PROCEDURE — 1123F ACP DISCUSS/DSCN MKR DOCD: CPT | Performed by: INTERNAL MEDICINE

## 2025-05-28 NOTE — PROGRESS NOTES
HPI:  70-year-old female here for followup. Overall, she is doing relatively well. She was recently in the dentist's office, has known white-coat syndrome. Blood pressure was very high. She has been checking it at home, seems to be doing much better and it is actually low today. No new chest pain, dyspnea, PND, orthopnea or edema. She has some rare palpitations, unchanged. 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     Impression:  1. History of palpitations, reasonably controlled or Bystolic.   2. Echocardiogram 2022 with normal function.  3. Mild mitral regurgitation by echocardiogram 2022.  4. History of GERD.  5. Remote thyroiditis.   6. Hypertension with white-coat syndrome.     Plan:  Blood pressure was high in the dentist's office and she has known white-coat syndrome. She also has increasing blood pressure when she gets anxious or nervous. Her Bystolic seems to be controlling blood pressure relatively well at home as well as her palpitations. Given her history of mild mitral regurgitation, I would like to follow up with an echocardiogram this year since it is been 5 years and I can see back annually as long as it is stable.         Wt Readings from Last 3 Encounters:   05/28/25 70.8 kg (156 lb)   08/08/24 67.6 kg (149 lb)   03/13/24 68.5 kg (151 lb 0.2 oz)     Past Medical History:   Diagnosis Date    History of echocardiogram 01/30/2007    EF >65%.  No significant valvular pathology.    History of gastroesophageal reflux (GERD)     Hyperthyroidism     s/thyroiditis.    Migraine equivalent     Palpitations     w/possible dual pathway on event monitor.       Current Outpatient Medications   Medication Sig Dispense Refill    bisoprolol (ZEBETA) 5 MG tablet Take 1 tablet by mouth daily 90 tablet 1    Cholecalciferol 50 MCG (2000 UT) TABS Take 1 tablet by

## 2025-06-14 ENCOUNTER — HOSPITAL ENCOUNTER (OUTPATIENT)
Facility: HOSPITAL | Age: 70
Discharge: HOME | End: 2025-06-17
Attending: FAMILY MEDICINE
Payer: MEDICARE

## 2025-06-14 VITALS — WEIGHT: 156 LBS | BODY MASS INDEX: 24.48 KG/M2 | HEIGHT: 67 IN

## 2025-06-14 DIAGNOSIS — Z12.31 ENCOUNTER FOR SCREENING MAMMOGRAM FOR MALIGNANT NEOPLASM OF BREAST: ICD-10-CM

## 2025-06-14 PROCEDURE — 77063 BREAST TOMOSYNTHESIS BI: CPT
